# Patient Record
Sex: FEMALE | Race: WHITE | NOT HISPANIC OR LATINO | Employment: OTHER | URBAN - NONMETROPOLITAN AREA
[De-identification: names, ages, dates, MRNs, and addresses within clinical notes are randomized per-mention and may not be internally consistent; named-entity substitution may affect disease eponyms.]

---

## 2022-01-23 PROCEDURE — U0004 COV-19 TEST NON-CDC HGH THRU: HCPCS | Performed by: NURSE PRACTITIONER

## 2022-06-23 ENCOUNTER — TELEPHONE (OUTPATIENT)
Dept: PODIATRY | Facility: CLINIC | Age: 62
End: 2022-06-23

## 2022-06-23 NOTE — TELEPHONE ENCOUNTER
Called pt to let her know about her upcoming apt with [facility] at 07/01/2022 @ 9:15, pt did confirm the apt

## 2022-07-19 ENCOUNTER — PATIENT ROUNDING (BHMG ONLY) (OUTPATIENT)
Dept: INTERNAL MEDICINE | Facility: CLINIC | Age: 62
End: 2022-07-19

## 2022-07-19 ENCOUNTER — OFFICE VISIT (OUTPATIENT)
Dept: INTERNAL MEDICINE | Facility: CLINIC | Age: 62
End: 2022-07-19

## 2022-07-19 VITALS
TEMPERATURE: 97.8 F | OXYGEN SATURATION: 98 % | HEIGHT: 64 IN | RESPIRATION RATE: 16 BRPM | BODY MASS INDEX: 31.14 KG/M2 | DIASTOLIC BLOOD PRESSURE: 86 MMHG | WEIGHT: 182.4 LBS | HEART RATE: 113 BPM | SYSTOLIC BLOOD PRESSURE: 184 MMHG

## 2022-07-19 DIAGNOSIS — E78.00 PURE HYPERCHOLESTEROLEMIA: ICD-10-CM

## 2022-07-19 DIAGNOSIS — I10 ESSENTIAL HYPERTENSION: ICD-10-CM

## 2022-07-19 DIAGNOSIS — Z00.01 ANNUAL VISIT FOR GENERAL ADULT MEDICAL EXAMINATION WITH ABNORMAL FINDINGS: ICD-10-CM

## 2022-07-19 DIAGNOSIS — R73.01 IMPAIRED FASTING GLUCOSE: ICD-10-CM

## 2022-07-19 DIAGNOSIS — I10 WHITE COAT SYNDROME WITH DIAGNOSIS OF HYPERTENSION: Primary | ICD-10-CM

## 2022-07-19 PROBLEM — E04.2 NONTOXIC MULTINODULAR GOITER: Status: ACTIVE | Noted: 2017-11-09

## 2022-07-19 PROBLEM — K57.30 DIVERTICULOSIS OF LARGE INTESTINE WITHOUT DIVERTICULITIS: Status: ACTIVE | Noted: 2017-11-09

## 2022-07-19 PROCEDURE — 99204 OFFICE O/P NEW MOD 45 MIN: CPT | Performed by: INTERNAL MEDICINE

## 2022-07-19 NOTE — PROGRESS NOTES
CC: Establish care for hypertension and prediabetes.    History:  Cheli Bah is a 61 y.o. female   She notes she has been doing well and is seeking to establish care after relocating to this area from the [facility].  She has known hypertension, but she notes significant exacerbations in the presence of whitecoats in the office.  She has brought a log of her blood pressures for the past few weeks showing systolic values between 110 and 120 and diastolic values between 60 and 70 for the most part.  She has no out of range values during this time period.  She has prediabetes, though her last A1c was 5.7% in December 2021.  She does continue on metformin without symptoms of hyperglycemia.        ROS:  Review of Systems   Constitutional: Negative for chills and fever.   Respiratory: Negative for cough and shortness of breath.    Cardiovascular: Negative for chest pain and palpitations.   Gastrointestinal: Negative for abdominal pain and constipation.   Genitourinary: Negative for difficulty urinating and dysuria.        reports that she quit smoking about 35 years ago. Her smoking use included cigarettes. She started smoking about 45 years ago. She has a 5.00 pack-year smoking history. She has never used smokeless tobacco. She reports current alcohol use. She reports that she does not use drugs.      Current Outpatient Medications:   •  aspirin 81 MG EC tablet, Take 81 mg by mouth., Disp: , Rfl:   •  atorvastatin (LIPITOR) 20 MG tablet, TAKE 1 TABLET BY MOUTH ONCE DAILY ON MONDAY, WEDNESDAY AND FRIDAY., Disp: , Rfl:   •  Calcium Carb-Cholecalciferol (Calcium Carbonate-Vitamin D3) 600-400 MG-UNIT tablet, Take  by mouth., Disp: , Rfl:   •  cetirizine (zyrTEC) 10 MG tablet, Take 10 mg by mouth Daily., Disp: , Rfl:   •  irbesartan (AVAPRO) 150 MG tablet, Take 150 mg by mouth Daily., Disp: , Rfl:   •  metFORMIN (GLUCOPHAGE) 500 MG tablet, Take 500 mg by mouth 2 (Two) Times a Day With Meals., Disp: , Rfl:  "    OBJECTIVE:  BP (!) 184/86 (BP Location: Right leg, Patient Position: Sitting, Cuff Size: Adult) Comment: auto cuff  Pulse 113   Temp 97.8 °F (36.6 °C)   Resp 16   Ht 162.6 cm (64\")   Wt 82.7 kg (182 lb 6.4 oz)   SpO2 98%   Breastfeeding No   BMI 31.31 kg/m²    Physical Exam  Constitutional:       General: She is not in acute distress.     Appearance: She is well-developed.   HENT:      Head: Normocephalic and atraumatic.      Right Ear: Ear canal and external ear normal.      Left Ear: Ear canal and external ear normal.   Eyes:      General: No scleral icterus.     Extraocular Movements: Extraocular movements intact.   Neck:      Trachea: No tracheal deviation.   Cardiovascular:      Rate and Rhythm: Normal rate and regular rhythm.      Heart sounds: Normal heart sounds. No murmur heard.  Pulmonary:      Effort: Pulmonary effort is normal. No accessory muscle usage or respiratory distress.      Breath sounds: Normal breath sounds. No wheezing.   Abdominal:      General: There is no distension.      Palpations: Abdomen is soft.      Tenderness: There is no abdominal tenderness.   Musculoskeletal:         General: Normal range of motion.      Cervical back: Normal range of motion and neck supple.      Right lower leg: No edema.      Left lower leg: No edema.   Skin:     General: Skin is warm and dry.      Nails: There is no clubbing.   Neurological:      Mental Status: She is alert and oriented to person, place, and time.      Coordination: Coordination normal.      Gait: Gait normal.   Psychiatric:         Mood and Affect: Mood normal. Mood is not anxious or depressed.         Behavior: Behavior normal.         Assessment/Plan     Diagnoses and all orders for this visit:    1. White coat syndrome with diagnosis of hypertension (Primary)  2. Essential hypertension  -     Comprehensive Metabolic Panel; Future  -     Lipid Panel; Future  -     Hepatitis C Antibody; Future  Well controlled, BP goal for age is " <140/90 per JNC 8 guidelines, continue current medications and monitor with ambulatory readings as she had done.     3. Impaired fasting glucose  -     Comprehensive Metabolic Panel; Future  -     Hemoglobin A1c; Future  A1c prior to follow-up.     4. Annual visit for general adult medical examination with abnormal findings  -     Hepatitis C Antibody; Future    5. Pure hypercholesterolemia  -     Comprehensive Metabolic Panel; Future  -     Lipid Panel; Future  She is using statin 3 times weekly with good control and may continue.       An After Visit Summary was printed and given to the patient at discharge.  Return in about 3 months (around 10/19/2022) for Annual physical with me.          Alex Torres D.O. 7/19/2022   Electronically signed.

## 2022-07-19 NOTE — PROGRESS NOTES
July 19, 2022    Hello, may I speak with Cheli Bah?    My name is Mariana Gibbons     I am  with [facility]  [address], [address]  [place] [address] [phone].    Before we get started may I verify your date of birth? 1960    I am calling to officially welcome you to our practice and ask about your recent visit. Is this a good time to talk? yes    Tell me about your visit with us. What things went well? The DrLouise is very easy to talk to!       We're always looking for ways to make our patients' experiences even better. Do you have recommendations on ways we may improve?  no    Overall were you satisfied with your first visit to our practice? yes       I appreciate you taking the time to speak with me today. Is there anything else I can do for you? no      Thank you, and have a great day.      
clear bilaterally.  Pupils equal, round, and reactive to light.

## 2022-08-01 ENCOUNTER — LAB (OUTPATIENT)
Dept: LAB | Facility: HOSPITAL | Age: 62
End: 2022-08-01

## 2022-08-01 ENCOUNTER — TELEPHONE (OUTPATIENT)
Dept: INTERNAL MEDICINE | Facility: CLINIC | Age: 62
End: 2022-08-01

## 2022-08-01 DIAGNOSIS — R31.0 GROSS HEMATURIA: Primary | ICD-10-CM

## 2022-08-01 DIAGNOSIS — Z00.01 ANNUAL VISIT FOR GENERAL ADULT MEDICAL EXAMINATION WITH ABNORMAL FINDINGS: ICD-10-CM

## 2022-08-01 DIAGNOSIS — E78.00 PURE HYPERCHOLESTEROLEMIA: ICD-10-CM

## 2022-08-01 DIAGNOSIS — R31.0 GROSS HEMATURIA: ICD-10-CM

## 2022-08-01 DIAGNOSIS — R73.01 IMPAIRED FASTING GLUCOSE: ICD-10-CM

## 2022-08-01 DIAGNOSIS — I10 ESSENTIAL HYPERTENSION: ICD-10-CM

## 2022-08-01 LAB
BACTERIA UR QL AUTO: ABNORMAL /HPF
BILIRUB UR QL STRIP: NEGATIVE
CLARITY UR: CLEAR
COLOR UR: ABNORMAL
GLUCOSE UR STRIP-MCNC: NEGATIVE MG/DL
HGB UR QL STRIP.AUTO: ABNORMAL
HYALINE CASTS UR QL AUTO: ABNORMAL /LPF
KETONES UR QL STRIP: ABNORMAL
LEUKOCYTE ESTERASE UR QL STRIP.AUTO: ABNORMAL
NITRITE UR QL STRIP: NEGATIVE
PH UR STRIP.AUTO: 5.5 [PH] (ref 5–8)
PROT UR QL STRIP: ABNORMAL
RBC # UR STRIP: ABNORMAL /HPF
REF LAB TEST METHOD: ABNORMAL
SP GR UR STRIP: 1.02 (ref 1–1.03)
SQUAMOUS #/AREA URNS HPF: ABNORMAL /HPF
UROBILINOGEN UR QL STRIP: ABNORMAL
WBC # UR STRIP: ABNORMAL /HPF

## 2022-08-01 PROCEDURE — 81001 URINALYSIS AUTO W/SCOPE: CPT

## 2022-08-01 PROCEDURE — 87086 URINE CULTURE/COLONY COUNT: CPT

## 2022-08-01 RX ORDER — NITROFURANTOIN 25; 75 MG/1; MG/1
100 CAPSULE ORAL 2 TIMES DAILY
Qty: 10 CAPSULE | Refills: 0 | Status: SHIPPED | OUTPATIENT
Start: 2022-08-01 | End: 2022-08-06

## 2022-08-01 NOTE — TELEPHONE ENCOUNTER
Patient stated that she is having blood in her urine when she urinates. No other symptoms. She is agreeable to a UA.

## 2022-08-02 LAB — BACTERIA SPEC AEROBE CULT: NO GROWTH

## 2022-08-10 NOTE — PROGRESS NOTES
[facility] - PODIATRY    Today's Date: 2022     Patient Name: Cheli Bah  MRN: [id number]  CSN: [id number]  PCP: Alex Torres DO  Referring Provider: No ref. provider found    SUBJECTIVE     Chief Complaint   Patient presents with   • Establish Care     Alex Torres DO PCP2022 Avulsion of toenail,- pt states first week of  dropped heavy box on left great nail, nail turned black and hasnt gotten much better- pt denies pain at this point- pt presents with left great nail blackish color   • Diabetes     Prediabetic      HPI: Cheli Bah, a 61 y.o.female, comes to clinic as a(n) new patient complaining of Discoloration to left hallux nail secondary to trauma. Patient has h/o Hypertension, hypercholesterolemia, thyroid nodules, diverticulitis, cataract, impaired fasting glucose. Patient presents complaints of discoloration of the left hallux nail.  Patient states that she dropped a heavy box on the toe at the beginning of .  She states that she went to urgent care for initial evaluation greater than 48 hours after the event.  Was told that she would need podiatry evaluation for further work-up.  States that since that time she has experienced no pain in the nail.  She states that nail does not feel loose.  She has had no signs or symptoms of infection including redness, drainage, or infection. Denies pain. Denies previous treatment. Denies any constitutional symptoms. No other pedal complaints at this time.    Past Medical History:   Diagnosis Date   • Allergic     Seasonal/25+ yrs   • Cataract 2022    Removed 2022   • Diverticulosis 2011   • History of medical problems 2002    Thyroid  nodules   • Hypercholesteremia    • Hypertension      Past Surgical History:   Procedure Laterality Date   •  SECTION     • COLONOSCOPY  2018    2nd one (@ 48 and 59 y/o)   • EYE SURGERY  2022    Cataracts removed   • HYSTERECTOMY     • KNEE  ARTHROSCOPY Bilateral     2015   • LYMPH NODE BIOPSY  Aug 2012     Family History   Problem Relation Age of Onset   • Hypertension Mother    • Rheum arthritis Mother    • Arthritis Mother         RA   • Hearing loss Mother    • Hyperlipidemia Mother    • Pneumonia Father         passed away with Bacteria Pneumonia   • Early death Father         Pneumonia ( at 32 y/o)   • Stroke Sister    • Seizures Daughter         unknown   • No Known Problems Maternal Grandmother    • No Known Problems Maternal Grandfather    • Stomach cancer Paternal Grandmother         passed at 86   • Cancer Paternal Grandmother         Stomach ( at 87 y/o)   • Stroke Paternal Grandfather         Stroke 1966   • Hyperlipidemia Brother    • Mental illness Sister         Bipolar   • Stroke Sister          2022   • Other Daughter         Seizure disorder (unknown)     Social History     Socioeconomic History   • Marital status:    Tobacco Use   • Smoking status: Former Smoker     Packs/day: 0.50     Years: 10.00     Pack years: 5.00     Types: Cigarettes     Start date: 1977     Quit date: 1987     Years since quittin.3   • Smokeless tobacco: Never Used   • Tobacco comment: Sporadic use during the 10 years. Quit for good at beginning of my first pregnancy.   Vaping Use   • Vaping Use: Never used   Substance and Sexual Activity   • Alcohol use: Yes     Comment: rarely    • Drug use: Never   • Sexual activity: Yes     Partners: Male     Birth control/protection: Surgical     Allergies   Allergen Reactions   • Amoxicillin-Pot Clavulanate Diarrhea   • Drug Ingredient [Levofloxacin] GI Intolerance   • Metoclopramide Anxiety     Current Outpatient Medications   Medication Sig Dispense Refill   • aspirin 81 MG EC tablet Take 81 mg by mouth.     • atorvastatin (LIPITOR) 20 MG tablet TAKE 1 TABLET BY MOUTH ONCE DAILY ON MONDAY, WEDNESDAY AND FRIDAY.     • Calcium Carb-Cholecalciferol (Calcium Carbonate-Vitamin  D3) 600-400 MG-UNIT tablet Take  by mouth.     • cetirizine (zyrTEC) 10 MG tablet Take 10 mg by mouth Daily.     • irbesartan (AVAPRO) 150 MG tablet Take 150 mg by mouth Daily.     • metFORMIN (GLUCOPHAGE) 500 MG tablet Take 500 mg by mouth 2 (Two) Times a Day With Meals.       No current facility-administered medications for this visit.     Review of Systems   Constitutional: Negative for chills and fever.   HENT: Negative for congestion.    Respiratory: Negative for shortness of breath.    Cardiovascular: Negative for chest pain and leg swelling.   Gastrointestinal: Negative for constipation, diarrhea, nausea and vomiting.   Musculoskeletal: Negative for arthralgias and myalgias.   Skin: Positive for color change. Negative for wound.   Neurological: Negative for numbness.       OBJECTIVE     Vitals:    08/12/22 1440   BP: 158/80   Pulse: 112   SpO2: 99%       PHYSICAL EXAM  GEN:   Accompanied by none.     Foot/Ankle Exam:       General:   Appearance: appears stated age and healthy    Orientation: AAOx3    Affect: appropriate    Gait: unimpaired    Assistance: independent    Shoe Gear:  Casual shoes    VASCULAR      Right Foot Vascularity   Dorsalis pedis:  2+  Posterior tibial:  2+  Skin Temperature: warm    Edema Grading:  None  CFT:  3  Pedal Hair Growth:  Present  Varicosities: none       Left Foot Vascularity   Dorsalis pedis:  2+  Posterior tibial:  2+  Skin Temperature: warm    Edema Grading:  None  CFT:  3  Pedal Hair Growth:  Present  Varicosities: none        NEUROLOGIC     Right Foot Neurologic   Normal sensation    Light touch sensation:  Normal  Vibratory sensation:  Normal  Hot/Cold sensation: normal    Protective Sensation using [place] Monofilament:  10     Left Foot Neurologic   Normal sensation    Light touch sensation:  Normal  Vibratory sensation:  Normal  Hot/cold sensation: normal    Protective Sensation using [place] Monofilament:  10     MUSCULOSKELETAL      Right Foot  Musculoskeletal   Ecchymosis:  None  Tenderness: none    Arch:  Normal     Left Foot Musculoskeletal   Ecchymosis:  None  Tenderness: none    Arch:  Normal     MUSCLE STRENGTH     Right Foot Muscle Strength   Foot dorsiflexion:  5  Foot plantar flexion:  5  Foot inversion:  5  Foot eversion:  5     Left Foot Muscle Strength   Foot dorsiflexion:  5  Foot plantar flexion:  5  Foot inversion:  5  Foot eversion:  5     RANGE OF MOTION      Right Foot Range of Motion   Foot and ankle ROM within normal limits       Left Foot Range of Motion   Foot and ankle ROM within normal limits       DERMATOLOGIC     Right Foot Dermatologic   Skin: skin intact       Left Foot Dermatologic   Skin: skin intact        RADIOLOGY/NUCLEAR:  No results found.    LABORATORY/CULTURE RESULTS:      PATHOLOGY RESULTS:       ASSESSMENT/PLAN     Diagnoses and all orders for this visit:    1. Subungual hematoma of great toe (Primary)    2. Impaired fasting glucose      Comprehensive lower extremity examination and evaluation was performed.  Discussed findings and treatment plan including risks, benefits, and treatment options with patient in detail. Patient agreed with treatment plan.  Findings are consistent with subungual hematoma of the left hallux.  Given that initial event was greater than 2 months ago and she has no active symptoms, no current treatment is needed at this time.  Patient was advised that nail growth proximally was at 1.5 to 2 mm which is consistent with timeframe of injury and that nail appears to be growing out appropriately.  She was advised that she could potentially lose the nail as it continues to grow out, however, given current findings there is no concern for any long-term damage or other acute issue.   Continue to follow with PCP for impaired fasting glucose.  If patient develops diabetes in the future we would see for routine annual diabetic foot exams.  An After Visit Summary was printed and given to the patient at  discharge, including (if requested) any available informative/educational handouts regarding diagnosis, treatment, or medications. All questions were answered to patient/family satisfaction. Should symptoms fail to improve or worsen they agree to call or return to clinic or to go to the Emergency Department. Discussed the importance of following up with any needed screening tests/labs/specialist appointments and any requested follow-up recommended by me today. Importance of maintaining follow-up discussed and patient accepts that missed appointments can delay diagnosis and potentially lead to worsening of conditions.  Return if symptoms worsen or fail to improve., or sooner if acute issues arise.        This document has been electronically signed by TERENCE Lunsford on August 15, 2022 08:37 CDT

## 2022-08-12 ENCOUNTER — OFFICE VISIT (OUTPATIENT)
Dept: PODIATRY | Facility: CLINIC | Age: 62
End: 2022-08-12

## 2022-08-12 VITALS
HEIGHT: 64 IN | OXYGEN SATURATION: 99 % | BODY MASS INDEX: 31.07 KG/M2 | SYSTOLIC BLOOD PRESSURE: 158 MMHG | HEART RATE: 112 BPM | WEIGHT: 182 LBS | DIASTOLIC BLOOD PRESSURE: 80 MMHG

## 2022-08-12 DIAGNOSIS — R73.01 IMPAIRED FASTING GLUCOSE: ICD-10-CM

## 2022-08-12 DIAGNOSIS — S90.219A SUBUNGUAL HEMATOMA OF GREAT TOE: Primary | ICD-10-CM

## 2022-08-12 PROCEDURE — 99213 OFFICE O/P EST LOW 20 MIN: CPT | Performed by: NURSE PRACTITIONER

## 2022-09-27 ENCOUNTER — OFFICE VISIT (OUTPATIENT)
Dept: INTERNAL MEDICINE | Facility: CLINIC | Age: 62
End: 2022-09-27

## 2022-09-27 VITALS
WEIGHT: 184 LBS | HEIGHT: 64 IN | BODY MASS INDEX: 31.41 KG/M2 | OXYGEN SATURATION: 97 % | HEART RATE: 98 BPM | SYSTOLIC BLOOD PRESSURE: 134 MMHG | DIASTOLIC BLOOD PRESSURE: 86 MMHG

## 2022-09-27 DIAGNOSIS — J06.9 UPPER RESPIRATORY TRACT INFECTION, UNSPECIFIED TYPE: ICD-10-CM

## 2022-09-27 DIAGNOSIS — J40 BRONCHITIS: Primary | ICD-10-CM

## 2022-09-27 PROCEDURE — 99213 OFFICE O/P EST LOW 20 MIN: CPT | Performed by: NURSE PRACTITIONER

## 2022-09-27 RX ORDER — AZITHROMYCIN 250 MG/1
TABLET, FILM COATED ORAL
Qty: 6 TABLET | Refills: 0 | Status: SHIPPED | OUTPATIENT
Start: 2022-09-27 | End: 2022-11-02

## 2022-09-27 NOTE — PROGRESS NOTES
Chief Complaint   Patient presents with   • Cough   • Nasal Congestion         History:  Cheli Bah is a 61 y.o. female who presents today for evaluation of the above problems.          For over a week she has had congestion and cough since being around her grandchild that had cold/croup symptoms.  2 days ago she did a home Covid test, which was negative.  In the last 3 days, she has noticed worsening cough and chest congestion. Slight yellow color to mucous.  No fever now but did have up to 100 when this started.      ROS:  Review of Systems  As above    Allergies   Allergen Reactions   • Amoxicillin-Pot Clavulanate Diarrhea   • Drug Ingredient [Levofloxacin] GI Intolerance   • Metoclopramide Anxiety     Past Medical History:   Diagnosis Date   • Allergic     Seasonal/25+ yrs   • Cataract 2022    Removed 2022   • Diverticulosis 2011   • History of medical problems 2002    Thyroid  nodules   • Hypercholesteremia    • Hypertension      Past Surgical History:   Procedure Laterality Date   •  SECTION     • COLONOSCOPY  2018    2nd one (@ 48 and 59 y/o)   • EYE SURGERY  2022    Cataracts removed   • HYSTERECTOMY     • KNEE ARTHROSCOPY Bilateral     2015   • LYMPH NODE BIOPSY  Aug 2012     Family History   Problem Relation Age of Onset   • Hypertension Mother    • Rheum arthritis Mother    • Arthritis Mother         RA   • Hearing loss Mother    • Hyperlipidemia Mother    • Pneumonia Father         passed away with Bacteria Pneumonia   • Early death Father         Pneumonia ( at 34 y/o)   • Stroke Sister    • Seizures Daughter         unknown   • No Known Problems Maternal Grandmother    • No Known Problems Maternal Grandfather    • Stomach cancer Paternal Grandmother         passed at 86   • Cancer Paternal Grandmother         Stomach ( at 87 y/o)   • Stroke Paternal Grandfather         Stroke 1966   • Hyperlipidemia Brother    • Mental illness Sister         Bipolar   •  "Stroke Sister          2022   • Other Daughter         Seizure disorder (unknown)     Cheli  reports that she quit smoking about 35 years ago. Her smoking use included cigarettes. She started smoking about 45 years ago. She has a 5.00 pack-year smoking history. She has never used smokeless tobacco. She reports current alcohol use. She reports that she does not use drugs.    I have reviewed and updated the above documentation (if necessary) including but not limited to chief complaint, ROS, PFSH, allergies and medications        Current Outpatient Medications:   •  aspirin 81 MG EC tablet, Take 81 mg by mouth., Disp: , Rfl:   •  atorvastatin (LIPITOR) 20 MG tablet, TAKE 1 TABLET BY MOUTH ONCE DAILY ON MONDAY, WEDNESDAY AND FRIDAY., Disp: , Rfl:   •  Calcium Carb-Cholecalciferol (Calcium Carbonate-Vitamin D3) 600-400 MG-UNIT tablet, Take  by mouth., Disp: , Rfl:   •  cetirizine (zyrTEC) 10 MG tablet, Take 10 mg by mouth Daily., Disp: , Rfl:   •  irbesartan (AVAPRO) 150 MG tablet, Take 150 mg by mouth Daily., Disp: , Rfl:   •  metFORMIN (GLUCOPHAGE) 500 MG tablet, Take 500 mg by mouth 2 (Two) Times a Day With Meals., Disp: , Rfl:   •  azithromycin (Zithromax Z-Haroon) 250 MG tablet, Don't take Lipitor while on this medication. Take 2 tablets by mouth on day 1, then 1 tablet daily on days 2-5. Take with food., Disp: 6 tablet, Rfl: 0    OBJECTIVE:  Visit Vitals  /86 (BP Location: Right arm, Patient Position: Sitting, Cuff Size: Adult)   Pulse 98   Ht 162.6 cm (64\")   Wt 83.5 kg (184 lb)   SpO2 97%   BMI 31.58 kg/m²      Physical Exam  Vitals and nursing note reviewed.   Constitutional:       General: She is not in acute distress.     Appearance: Normal appearance. She is not ill-appearing, toxic-appearing or diaphoretic.   HENT:      Head: Normocephalic and atraumatic.      Right Ear: Tympanic membrane, ear canal and external ear normal. There is no impacted cerumen.      Left Ear: Tympanic membrane, ear " canal and external ear normal. There is no impacted cerumen.      Nose: Nose normal. No congestion or rhinorrhea.      Comments: Tender left maxillary sinus region     Mouth/Throat:      Mouth: Mucous membranes are moist.      Pharynx: Oropharynx is clear. No oropharyngeal exudate or posterior oropharyngeal erythema.   Eyes:      General: No scleral icterus.        Right eye: No discharge.         Left eye: No discharge.      Conjunctiva/sclera: Conjunctivae normal.      Pupils: Pupils are equal, round, and reactive to light.   Cardiovascular:      Rate and Rhythm: Normal rate and regular rhythm.   Pulmonary:      Effort: Pulmonary effort is normal. No respiratory distress.      Breath sounds: Normal breath sounds. No wheezing.      Comments: Persistent, hacky cough noted throughout visit  Abdominal:      General: There is no distension.      Palpations: Abdomen is soft.      Tenderness: There is no abdominal tenderness.   Musculoskeletal:      Cervical back: Neck supple. No tenderness.      Right lower leg: No edema.      Left lower leg: No edema.   Lymphadenopathy:      Cervical: No cervical adenopathy.   Skin:     General: Skin is warm and dry.      Findings: No rash.   Neurological:      General: No focal deficit present.      Mental Status: She is alert and oriented to person, place, and time.   Psychiatric:         Mood and Affect: Mood normal.         Behavior: Behavior normal.         Thought Content: Thought content normal.         Judgment: Judgment normal.         [facility]    Assessment/Plan    Diagnoses and all orders for this visit:    1. Bronchitis (Primary)  -     azithromycin (Zithromax Z-Haroon) 250 MG tablet; Don't take Lipitor while on this medication. Take 2 tablets by mouth on day 1, then 1 tablet daily on days 2-5. Take with food.  Dispense: 6 tablet; Refill: 0    2. Upper respiratory tract infection, unspecified type      Patient with likely viral URI following exposure to sick grandchild, with  symptoms over 1 week, now worsening cough.  I would like to treat for secondary bacterial infection and have asked her to take Mucinex that she has at home and increase fluids.  Let us know if worsening.         Education materials and an After Visit Summary were printed and given to the patient at discharge.  Return if symptoms worsen or fail to improve, for Next scheduled follow up.         Marlys Vincent, APRN   12:49 CDT  9/27/2022

## 2022-09-28 ENCOUNTER — TELEPHONE (OUTPATIENT)
Dept: INTERNAL MEDICINE | Facility: CLINIC | Age: 62
End: 2022-09-28

## 2022-09-28 NOTE — TELEPHONE ENCOUNTER
PATIENT HAS BEEN CALLED, SHE IS ASKING FOR SOME KIND OF PROBIOTIC THAT IS OVER THE COUNTER THAT IS RECOMMENDED.

## 2022-09-28 NOTE — TELEPHONE ENCOUNTER
Caller: Cheli Bah    Relationship: Self    Best call back number: [phone]      Who are you requesting to speak with (clinical staff, provider,  specific staff member): CLINICAL STAFF    Do you know the name of the person who called: PATIENT    What was the call regarding: RECOMMEND FOR PROBIOTIC FOR OVER THE COUNTER     Do you require a callback: YES

## 2022-10-12 ENCOUNTER — CLINICAL SUPPORT (OUTPATIENT)
Dept: INTERNAL MEDICINE | Facility: CLINIC | Age: 62
End: 2022-10-12

## 2022-10-12 DIAGNOSIS — Z23 NEED FOR VACCINATION: Primary | ICD-10-CM

## 2022-10-12 PROCEDURE — 0124A PR ADM SARSCOV2 30MCG/0.3ML BST: CPT | Performed by: INTERNAL MEDICINE

## 2022-10-12 PROCEDURE — 90471 IMMUNIZATION ADMIN: CPT | Performed by: INTERNAL MEDICINE

## 2022-10-12 PROCEDURE — 91312 COVID-19 (PFIZER) BIVALENT BOOSTER 12+YRS: CPT | Performed by: INTERNAL MEDICINE

## 2022-10-12 PROCEDURE — 90686 IIV4 VACC NO PRSV 0.5 ML IM: CPT | Performed by: INTERNAL MEDICINE

## 2022-10-12 NOTE — PROGRESS NOTES
Injection  Injection performed in Left Deltoid Bivalent Booster- Right Deltoid Flulave/Fluzone  by Channing Tomlinson MA. Patient tolerated the procedure well without complications.  10/12/22   Channing Tomlinson MA

## 2022-11-02 ENCOUNTER — OFFICE VISIT (OUTPATIENT)
Dept: INTERNAL MEDICINE | Facility: CLINIC | Age: 62
End: 2022-11-02

## 2022-11-02 VITALS
WEIGHT: 183.5 LBS | DIASTOLIC BLOOD PRESSURE: 80 MMHG | SYSTOLIC BLOOD PRESSURE: 158 MMHG | BODY MASS INDEX: 31.33 KG/M2 | TEMPERATURE: 98 F | OXYGEN SATURATION: 98 % | HEART RATE: 111 BPM | RESPIRATION RATE: 16 BRPM | HEIGHT: 64 IN

## 2022-11-02 DIAGNOSIS — I10 ESSENTIAL HYPERTENSION: ICD-10-CM

## 2022-11-02 DIAGNOSIS — E04.2 NONTOXIC MULTINODULAR GOITER: ICD-10-CM

## 2022-11-02 DIAGNOSIS — Z00.01 ANNUAL VISIT FOR GENERAL ADULT MEDICAL EXAMINATION WITH ABNORMAL FINDINGS: ICD-10-CM

## 2022-11-02 DIAGNOSIS — M76.822 POSTERIOR TIBIAL TENDINITIS OF LEFT LOWER EXTREMITY: Primary | ICD-10-CM

## 2022-11-02 DIAGNOSIS — R73.02 IMPAIRED GLUCOSE TOLERANCE: ICD-10-CM

## 2022-11-02 PROBLEM — R73.01 IMPAIRED FASTING GLUCOSE: Status: RESOLVED | Noted: 2017-11-09 | Resolved: 2022-11-02

## 2022-11-02 PROCEDURE — 99214 OFFICE O/P EST MOD 30 MIN: CPT | Performed by: INTERNAL MEDICINE

## 2022-11-02 NOTE — PROGRESS NOTES
"CC: left foot/ankle pain    History:  Cheli Bah is a 61 y.o. female   She notes she has had issues with posterior tibial tendinitis in the past that improved with stretching and PT, but this was some time ago. SHe notes it is intermittent and waxes and wanes in intensity.        ROS:  Review of Systems   Respiratory: Negative for shortness of breath.    Musculoskeletal: Positive for arthralgias and myalgias. Negative for gait problem.        reports that she quit smoking about 35 years ago. Her smoking use included cigarettes. She started smoking about 45 years ago. She has a 5.00 pack-year smoking history. She has never used smokeless tobacco. She reports current alcohol use. She reports that she does not use drugs.      Current Outpatient Medications:   •  aspirin 81 MG EC tablet, Take 81 mg by mouth., Disp: , Rfl:   •  atorvastatin (LIPITOR) 20 MG tablet, TAKE 1 TABLET BY MOUTH ONCE DAILY ON MONDAY, WEDNESDAY AND FRIDAY., Disp: , Rfl:   •  Calcium Carb-Cholecalciferol (Calcium Carbonate-Vitamin D3) 600-400 MG-UNIT tablet, Take  by mouth., Disp: , Rfl:   •  cetirizine (zyrTEC) 10 MG tablet, Take 10 mg by mouth Daily., Disp: , Rfl:   •  irbesartan (AVAPRO) 150 MG tablet, Take 150 mg by mouth Daily., Disp: , Rfl:   •  metFORMIN (GLUCOPHAGE) 500 MG tablet, Take 500 mg by mouth 2 (Two) Times a Day With Meals., Disp: , Rfl:     OBJECTIVE:  /80 (BP Location: Right arm, Patient Position: Sitting, Cuff Size: Adult)   Pulse 111   Temp 98 °F (36.7 °C)   Resp 16   Ht 162.6 cm (64\")   Wt 83.2 kg (183 lb 8 oz)   SpO2 98%   BMI 31.50 kg/m²    Physical Exam  Constitutional:       General: She is not in acute distress.  Pulmonary:      Effort: Pulmonary effort is normal. No respiratory distress.   Musculoskeletal:      Comments: Mild tenderness over medial left ankle.    Neurological:      Mental Status: She is alert and oriented to person, place, and time.         Assessment/Plan     Diagnoses and all orders for " this visit:    1. Posterior tibial tendinitis of left lower extremity (Primary)  Stretches with AVS and encourage rest, ice, compression and elevation.     2. Essential hypertension  -     CBC (No Diff)  -     Comprehensive Metabolic Panel  -     Lipid Panel  Well controlled, BP goal for age is <140/90 per JNC 8 guidelines and continue current medications    3. Impaired glucose tolerance  -     Hemoglobin A1c  Labs prior to next visit.     4. Nontoxic multinodular goiter  -     US Thyroid; Future  -     TSH  Lab and US for evaluation.     5. Annual visit for general adult medical examination with abnormal findings  -     CBC (No Diff)  -     Comprehensive Metabolic Panel  -     Hemoglobin A1c  -     Hepatitis C Antibody  -     Lipid Panel    An After Visit Summary was printed and given to the patient at discharge.  Return for Next scheduled follow up.          Alex Torres D.O. 11/2/2022   Electronically signed.

## 2022-11-07 ENCOUNTER — OFFICE VISIT (OUTPATIENT)
Dept: INTERNAL MEDICINE | Facility: CLINIC | Age: 62
End: 2022-11-07

## 2022-11-07 ENCOUNTER — LAB (OUTPATIENT)
Dept: LAB | Facility: HOSPITAL | Age: 62
End: 2022-11-07

## 2022-11-07 VITALS
HEIGHT: 64 IN | DIASTOLIC BLOOD PRESSURE: 82 MMHG | OXYGEN SATURATION: 98 % | RESPIRATION RATE: 16 BRPM | SYSTOLIC BLOOD PRESSURE: 138 MMHG | WEIGHT: 185.5 LBS | HEART RATE: 90 BPM | BODY MASS INDEX: 31.67 KG/M2 | TEMPERATURE: 98 F

## 2022-11-07 DIAGNOSIS — N89.8 VAGINAL ITCHING: ICD-10-CM

## 2022-11-07 DIAGNOSIS — N89.8 VAGINAL DISCHARGE: Primary | ICD-10-CM

## 2022-11-07 DIAGNOSIS — N89.8 VAGINAL DISCHARGE: ICD-10-CM

## 2022-11-07 LAB
BILIRUB UR QL STRIP: NEGATIVE
CLARITY UR: CLEAR
COLOR UR: YELLOW
GLUCOSE UR STRIP-MCNC: NEGATIVE MG/DL
HGB UR QL STRIP.AUTO: NEGATIVE
KETONES UR QL STRIP: NEGATIVE
LEUKOCYTE ESTERASE UR QL STRIP.AUTO: NEGATIVE
NITRITE UR QL STRIP: NEGATIVE
PH UR STRIP.AUTO: 6 [PH] (ref 5–8)
PROT UR QL STRIP: NEGATIVE
SP GR UR STRIP: 1.01 (ref 1–1.03)
UROBILINOGEN UR QL STRIP: NORMAL

## 2022-11-07 PROCEDURE — 87591 N.GONORRHOEAE DNA AMP PROB: CPT

## 2022-11-07 PROCEDURE — 99213 OFFICE O/P EST LOW 20 MIN: CPT | Performed by: INTERNAL MEDICINE

## 2022-11-07 PROCEDURE — 87491 CHLMYD TRACH DNA AMP PROBE: CPT

## 2022-11-07 PROCEDURE — 87661 TRICHOMONAS VAGINALIS AMPLIF: CPT

## 2022-11-07 PROCEDURE — 81003 URINALYSIS AUTO W/O SCOPE: CPT

## 2022-11-07 NOTE — PROGRESS NOTES
"CC: vaginal discharge    History:  Cheli Bah is a 61 y.o. female   She notes a green vaginal discharge that began last Friday and has persisted through the weekend. It is thick and has not varied since. No fever or chills. She has not taken anything for it and notes a generalized vaginal itching, but has not had pain or dysuria.  She has not been sexually active over the last several months.       ROS:  Review of Systems   Constitutional: Negative for chills and fever.   HENT: Negative for sore throat.    Gastrointestinal: Negative for abdominal pain, constipation, diarrhea, nausea and vomiting.   Genitourinary: Positive for vaginal discharge. Negative for dysuria, flank pain, frequency, hematuria, pelvic pain and urgency.   Musculoskeletal: Positive for back pain.   Skin: Negative for rash.   Neurological: Negative for headaches.        reports that she quit smoking about 35 years ago. Her smoking use included cigarettes. She started smoking about 45 years ago. She has a 5.00 pack-year smoking history. She has never used smokeless tobacco. She reports current alcohol use. She reports that she does not use drugs.        OBJECTIVE:  /82 (BP Location: Left arm, Patient Position: Sitting, Cuff Size: Adult)   Pulse 90   Temp 98 °F (36.7 °C)   Resp 16   Ht 162.6 cm (64\")   Wt 84.1 kg (185 lb 8 oz)   SpO2 98%   BMI 31.84 kg/m²    Physical Exam  Constitutional:       General: She is not in acute distress.  Pulmonary:      Effort: Pulmonary effort is normal. No respiratory distress.   Neurological:      Mental Status: She is alert and oriented to person, place, and time.         Assessment/Plan     Diagnoses and all orders for this visit:    1. Vaginal discharge (Primary)  2. Vaginal itching  -     Urinalysis With Culture If Indicated -; Future  -     Trichomonas vaginalis, PCR - Urine, Urine, Clean Catch; Future  -     Chlamydia trachomatis, Neisseria gonorrhoeae, PCR - Urine, Urine, Clean Catch; " Future  Urine studies for further evaluation. Suspect possible yeast, so may treat empirically if studies negative, though would consider pelvic examination if not improving.     An After Visit Summary was printed and given to the patient at discharge.  No follow-ups on file.          Alex Torres D.O. 11/7/2022   Electronically signed.

## 2022-11-09 LAB
C TRACH RRNA CVX QL NAA+PROBE: NOT DETECTED
N GONORRHOEA RRNA SPEC QL NAA+PROBE: NOT DETECTED
TRICHOMONAS VAGINALIS PCR: NOT DETECTED

## 2022-11-09 RX ORDER — FLUCONAZOLE 150 MG/1
150 TABLET ORAL
Qty: 2 TABLET | Refills: 0 | Status: SHIPPED | OUTPATIENT
Start: 2022-11-09 | End: 2022-11-13

## 2022-11-15 ENCOUNTER — HOSPITAL ENCOUNTER (OUTPATIENT)
Dept: ULTRASOUND IMAGING | Facility: HOSPITAL | Age: 62
Discharge: HOME | End: 2022-11-15
Admitting: INTERNAL MEDICINE

## 2022-11-15 DIAGNOSIS — E04.2 NONTOXIC MULTINODULAR GOITER: ICD-10-CM

## 2022-11-15 PROCEDURE — 76536 US EXAM OF HEAD AND NECK: CPT

## 2022-12-08 LAB
ALBUMIN SERPL-MCNC: 4.6 G/DL (ref 3.8–4.8)
ALBUMIN/GLOB SERPL: 1.9 {RATIO} (ref 1.2–2.2)
ALP SERPL-CCNC: 72 IU/L (ref 44–121)
ALT SERPL-CCNC: 13 IU/L (ref 0–32)
AST SERPL-CCNC: 16 IU/L (ref 0–40)
BILIRUB SERPL-MCNC: 0.3 MG/DL (ref 0–1.2)
BUN SERPL-MCNC: 20 MG/DL (ref 8–27)
BUN/CREAT SERPL: 26 (ref 12–28)
CALCIUM SERPL-MCNC: 9.7 MG/DL (ref 8.7–10.3)
CHLORIDE SERPL-SCNC: 101 MMOL/L (ref 96–106)
CHOLEST SERPL-MCNC: 181 MG/DL (ref 100–199)
CO2 SERPL-SCNC: 24 MMOL/L (ref 20–29)
CREAT SERPL-MCNC: 0.78 MG/DL (ref 0.57–1)
EGFRCR SERPLBLD CKD-EPI 2021: 85 ML/MIN/1.73
ERYTHROCYTE [DISTWIDTH] IN BLOOD BY AUTOMATED COUNT: 13.1 % (ref 11.7–15.4)
GLOBULIN SER CALC-MCNC: 2.4 G/DL (ref 1.5–4.5)
GLUCOSE SERPL-MCNC: 107 MG/DL (ref 70–99)
HBA1C MFR BLD: 6 % (ref 4.8–5.6)
HCT VFR BLD AUTO: 38.3 % (ref 34–46.6)
HCV AB S/CO SERPL IA: <0.1 S/CO RATIO (ref 0–0.9)
HDLC SERPL-MCNC: 51 MG/DL
HGB BLD-MCNC: 12.9 G/DL (ref 11.1–15.9)
LDLC SERPL CALC-MCNC: 89 MG/DL (ref 0–99)
MCH RBC QN AUTO: 29.6 PG (ref 26.6–33)
MCHC RBC AUTO-ENTMCNC: 33.7 G/DL (ref 31.5–35.7)
MCV RBC AUTO: 88 FL (ref 79–97)
PLATELET # BLD AUTO: 295 X10E3/UL (ref 150–450)
POTASSIUM SERPL-SCNC: 4.9 MMOL/L (ref 3.5–5.2)
PROT SERPL-MCNC: 7 G/DL (ref 6–8.5)
RBC # BLD AUTO: 4.36 X10E6/UL (ref 3.77–5.28)
SODIUM SERPL-SCNC: 139 MMOL/L (ref 134–144)
TRIGL SERPL-MCNC: 243 MG/DL (ref 0–149)
TSH SERPL DL<=0.005 MIU/L-ACNC: 0.56 UIU/ML (ref 0.45–4.5)
VLDLC SERPL CALC-MCNC: 41 MG/DL (ref 5–40)
WBC # BLD AUTO: 10.1 X10E3/UL (ref 3.4–10.8)

## 2022-12-12 ENCOUNTER — OFFICE VISIT (OUTPATIENT)
Dept: INTERNAL MEDICINE | Facility: CLINIC | Age: 62
End: 2022-12-12

## 2022-12-12 VITALS
WEIGHT: 182.4 LBS | BODY MASS INDEX: 31.14 KG/M2 | TEMPERATURE: 98 F | HEIGHT: 64 IN | RESPIRATION RATE: 16 BRPM | DIASTOLIC BLOOD PRESSURE: 80 MMHG | HEART RATE: 104 BPM | OXYGEN SATURATION: 98 % | SYSTOLIC BLOOD PRESSURE: 150 MMHG

## 2022-12-12 DIAGNOSIS — I10 ESSENTIAL HYPERTENSION: ICD-10-CM

## 2022-12-12 DIAGNOSIS — E78.00 PURE HYPERCHOLESTEROLEMIA: ICD-10-CM

## 2022-12-12 DIAGNOSIS — Z00.01 ANNUAL VISIT FOR GENERAL ADULT MEDICAL EXAMINATION WITH ABNORMAL FINDINGS: Primary | ICD-10-CM

## 2022-12-12 DIAGNOSIS — R73.02 IMPAIRED GLUCOSE TOLERANCE: ICD-10-CM

## 2022-12-12 PROCEDURE — 99396 PREV VISIT EST AGE 40-64: CPT | Performed by: INTERNAL MEDICINE

## 2022-12-12 RX ORDER — ATORVASTATIN CALCIUM 20 MG/1
20 TABLET, FILM COATED ORAL 3 TIMES WEEKLY
Qty: 36 TABLET | Refills: 3 | Status: SHIPPED | OUTPATIENT
Start: 2022-12-12

## 2022-12-12 RX ORDER — IRBESARTAN 150 MG/1
150 TABLET ORAL DAILY
Qty: 90 TABLET | Refills: 3 | Status: SHIPPED | OUTPATIENT
Start: 2022-12-12

## 2022-12-12 NOTE — PROGRESS NOTES
CC: f/u preventive health    History:  Cheli Bah is a 62 y.o. female who presents today for evaluation of the above problems.  She notes she has been doing well without acute illness and has seen improvement in her foot pain. She has brought a blood pressure log showing all values over the past 10 days <140/90.       ROS:  Review of Systems   Constitutional: Negative for chills and fever.   HENT: Negative for congestion and sore throat.    Eyes: Negative for visual disturbance.   Respiratory: Negative for cough and shortness of breath.    Cardiovascular: Negative for chest pain and palpitations.   Gastrointestinal: Negative for abdominal pain, constipation and nausea.   Endocrine: Negative for cold intolerance and heat intolerance.   Genitourinary: Negative for difficulty urinating and frequency.   Musculoskeletal: Negative for arthralgias and back pain.   Skin: Negative for rash.   Neurological: Negative for dizziness and headaches.   Psychiatric/Behavioral: Negative for dysphoric mood. The patient is not nervous/anxious.        Allergies   Allergen Reactions   • Amoxicillin-Pot Clavulanate Diarrhea   • Drug Ingredient [Levofloxacin] GI Intolerance   • Metoclopramide Anxiety     Past Medical History:   Diagnosis Date   • Allergic     Seasonal/25+ yrs   • Cataract 2022    Removed 2022   • Diverticulosis 2011   • History of medical problems     Thyroid  nodules   • Hypercholesteremia    • Hypertension      Past Surgical History:   Procedure Laterality Date   •  SECTION     • COLONOSCOPY  2018    2nd one (@ 48 and 57 y/o)   • EYE SURGERY  2022    Cataracts removed   • HYSTERECTOMY     • KNEE ARTHROSCOPY Bilateral     2015   • LYMPH NODE BIOPSY  Aug 2012     Family History   Problem Relation Age of Onset   • Hypertension Mother    • Rheum arthritis Mother    • Arthritis Mother         RA   • Hearing loss Mother    • Hyperlipidemia Mother    • Pneumonia Father         passed  "away with Bacteria Pneumonia   • Early death Father         Pneumonia ( at 34 y/o)   • Stroke Sister    • Seizures Daughter         unknown   • No Known Problems Maternal Grandmother    • No Known Problems Maternal Grandfather    • Stomach cancer Paternal Grandmother         passed at 86   • Cancer Paternal Grandmother         Stomach ( at 87 y/o)   • Stroke Paternal Grandfather         Stroke 1966   • Hyperlipidemia Brother    • Mental illness Sister         Bipolar   • Stroke Sister          2022   • Other Daughter         Seizure disorder (unknown)      reports that she quit smoking about 35 years ago. Her smoking use included cigarettes. She started smoking about 45 years ago. She has a 5.00 pack-year smoking history. She has never used smokeless tobacco. She reports current alcohol use. She reports that she does not use drugs.      Current Outpatient Medications:   •  aspirin 81 MG EC tablet, Take 81 mg by mouth., Disp: , Rfl:   •  atorvastatin (LIPITOR) 20 MG tablet, Take 1 tablet by mouth 3 (Three) Times a Week., Disp: 36 tablet, Rfl: 3  •  Calcium Carb-Cholecalciferol (Calcium Carbonate-Vitamin D3) 600-400 MG-UNIT tablet, Take  by mouth., Disp: , Rfl:   •  irbesartan (AVAPRO) 150 MG tablet, Take 1 tablet by mouth Daily., Disp: 90 tablet, Rfl: 3  •  metFORMIN (GLUCOPHAGE) 500 MG tablet, Take 1 tablet by mouth 2 (Two) Times a Day With Meals., Disp: 180 tablet, Rfl: 3  •  cetirizine (zyrTEC) 10 MG tablet, Take 10 mg by mouth Daily., Disp: , Rfl:     OBJECTIVE:  /80 (BP Location: Left arm, Patient Position: Sitting, Cuff Size: Adult)   Pulse 104   Temp 98 °F (36.7 °C)   Resp 16   Ht 162.6 cm (64\")   Wt 82.7 kg (182 lb 6.4 oz)   SpO2 98%   BMI 31.31 kg/m²    Physical Exam  Exam conducted with a chaperone present.   Constitutional:       General: She is not in acute distress.     Appearance: She is well-developed.   HENT:      Head: Normocephalic and atraumatic.      Right Ear: " Tympanic membrane and external ear normal. There is no impacted cerumen.      Left Ear: Tympanic membrane and external ear normal. There is no impacted cerumen.   Eyes:      General: No scleral icterus.     Extraocular Movements: Extraocular movements intact.   Neck:      Trachea: No tracheal deviation.   Cardiovascular:      Rate and Rhythm: Normal rate and regular rhythm.      Heart sounds: Normal heart sounds. No murmur heard.  Pulmonary:      Effort: Pulmonary effort is normal. No accessory muscle usage or respiratory distress.      Breath sounds: Normal breath sounds. No wheezing.   Chest:   Breasts:     Right: No swelling, inverted nipple or mass.      Left: No swelling, inverted nipple or mass.   Abdominal:      General: There is no distension.      Palpations: Abdomen is soft.      Tenderness: There is no abdominal tenderness.   Musculoskeletal:         General: Normal range of motion.      Cervical back: Normal range of motion and neck supple.      Right lower leg: No edema.      Left lower leg: No edema.   Lymphadenopathy:      Upper Body:      Right upper body: No axillary adenopathy.      Left upper body: No axillary adenopathy.   Skin:     General: Skin is warm and dry.      Nails: There is no clubbing.   Neurological:      Mental Status: She is alert and oriented to person, place, and time.      Coordination: Coordination normal.      Gait: Gait normal.   Psychiatric:         Mood and Affect: Mood normal. Mood is not anxious or depressed.         Behavior: Behavior normal.             Assessment/Plan     Diagnoses and all orders for this visit:    1. Annual visit for general adult medical examination with abnormal findings (Primary)  Immunizations:      - Tetanus: Received in 2021      - Influenza: Received in 2022      - Prevnar: Once after age 65      - Shingrix: Received in 2021      - COVID: Up to date with bivalent booster.  CRC screening: Colonoscopy done 4 years ago with 10 year  recall.  Mammogram: was done on approximately 11/2021 and the result was: Birads I (Normal).  PAP: discontinued secondary to benign hysterectomy.  DEXA: DEXA scan at 63     2. Essential hypertension  -     irbesartan (AVAPRO) 150 MG tablet; Take 1 tablet by mouth Daily.  Dispense: 90 tablet; Refill: 3  Fair control, BP goal for age is <140/90 per JNC 8 guidelines, continue current medications and ambulatory readings noted.     3. Impaired glucose tolerance  -     metFORMIN (GLUCOPHAGE) 500 MG tablet; Take 1 tablet by mouth 2 (Two) Times a Day With Meals.  Dispense: 180 tablet; Refill: 3  Continue metformin and dietary modifications.     4. Pure hypercholesterolemia  -     atorvastatin (LIPITOR) 20 MG tablet; Take 1 tablet by mouth 3 (Three) Times a Week.  Dispense: 36 tablet; Refill: 3  Stable on moderate intensity statin therapy per ACC/AHA guidelines.       An After Visit Summary was printed and given to the patient at discharge.  Return in about 10 months (around 10/12/2023) for Annual physical.         Alex Torres D.O. 12/12/2022   Electronically signed.

## 2022-12-29 ENCOUNTER — OFFICE VISIT (OUTPATIENT)
Dept: INTERNAL MEDICINE | Facility: CLINIC | Age: 62
End: 2022-12-29

## 2022-12-29 VITALS
BODY MASS INDEX: 31.41 KG/M2 | DIASTOLIC BLOOD PRESSURE: 81 MMHG | HEART RATE: 92 BPM | WEIGHT: 184 LBS | RESPIRATION RATE: 16 BRPM | SYSTOLIC BLOOD PRESSURE: 151 MMHG | HEIGHT: 64 IN | OXYGEN SATURATION: 100 %

## 2022-12-29 DIAGNOSIS — J22 LOWER RESPIRATORY INFECTION: Primary | ICD-10-CM

## 2022-12-29 PROCEDURE — 99213 OFFICE O/P EST LOW 20 MIN: CPT | Performed by: NURSE PRACTITIONER

## 2022-12-29 RX ORDER — AZITHROMYCIN 250 MG/1
TABLET, FILM COATED ORAL
Qty: 6 TABLET | Refills: 0 | Status: SHIPPED | OUTPATIENT
Start: 2022-12-29

## 2022-12-29 RX ORDER — ALBUTEROL SULFATE 90 UG/1
2 AEROSOL, METERED RESPIRATORY (INHALATION) EVERY 4 HOURS PRN
Qty: 8 G | Refills: 0 | Status: SHIPPED | OUTPATIENT
Start: 2022-12-29

## 2022-12-29 NOTE — PROGRESS NOTES
Chief Complaint   Patient presents with   • Cough   • URI     Chest congestion        HPI     Cheli Bah is a 62 y.o. female presents for the above problems. Symptoms have been present for 10 days. She is taking mucinex for productive cough. She has taken 2 COVID tests at home which were negative.          ROS:  Review of Systems   Constitutional: Negative for chills and fever.   HENT: Positive for postnasal drip and sore throat. Negative for ear pain and rhinorrhea.    Respiratory: Positive for cough and wheezing. Negative for shortness of breath.    Cardiovascular: Negative for chest pain.   Musculoskeletal: Negative for myalgias.   Skin: Negative for rash.   Neurological: Positive for headaches.          reports that she quit smoking about 35 years ago. Her smoking use included cigarettes. She started smoking about 46 years ago. She has a 5.00 pack-year smoking history. She has never used smokeless tobacco. She reports current alcohol use. She reports that she does not use drugs.    Current Outpatient Medications:   •  aspirin 81 MG EC tablet, Take 81 mg by mouth., Disp: , Rfl:   •  atorvastatin (LIPITOR) 20 MG tablet, Take 1 tablet by mouth 3 (Three) Times a Week., Disp: 36 tablet, Rfl: 3  •  Calcium Carb-Cholecalciferol (Calcium Carbonate-Vitamin D3) 600-400 MG-UNIT tablet, Take  by mouth., Disp: , Rfl:   •  cetirizine (zyrTEC) 10 MG tablet, Take 10 mg by mouth Daily., Disp: , Rfl:   •  irbesartan (AVAPRO) 150 MG tablet, Take 1 tablet by mouth Daily., Disp: 90 tablet, Rfl: 3  •  metFORMIN (GLUCOPHAGE) 500 MG tablet, Take 1 tablet by mouth 2 (Two) Times a Day With Meals., Disp: 180 tablet, Rfl: 3  •  albuterol sulfate  (90 Base) MCG/ACT inhaler, Inhale 2 puffs Every 4 (Four) Hours As Needed for Wheezing., Disp: 8 g, Rfl: 0  •  azithromycin (Zithromax Z-Haroon) 250 MG tablet, Take 2 tablets by mouth on day 1, then 1 tablet daily on days 2-5, Disp: 6 tablet, Rfl: 0    OBJECTIVE:  /81 (BP Location: Left  "arm, Patient Position: Sitting, Cuff Size: Other (Comment))   Pulse 92   Resp 16   Ht 162.6 cm (64\")   Wt 83.5 kg (184 lb)   SpO2 100%   BMI 31.58 kg/m²    Physical Exam  Constitutional:       General: She is not in acute distress.  Cardiovascular:      Rate and Rhythm: Normal rate and regular rhythm.      Heart sounds: Normal heart sounds.   Pulmonary:      Breath sounds: Wheezing present.      Comments: Wheeze, improved with cough         ASSESSMENT/PLAN:     Diagnoses and all orders for this visit:    1. Lower respiratory infection (Primary)  -     azithromycin (Zithromax Z-Haroon) 250 MG tablet; Take 2 tablets by mouth on day 1, then 1 tablet daily on days 2-5  Dispense: 6 tablet; Refill: 0  -     albuterol sulfate  (90 Base) MCG/ACT inhaler; Inhale 2 puffs Every 4 (Four) Hours As Needed for Wheezing.  Dispense: 8 g; Refill: 0  Continue mucinex with azithromycin for improvement of symptoms. Albuterol if needed. She has taken 2 COVID tests and declines flu test today.     An After Visit Summary was printed and given to the patient at discharge.  Return if symptoms worsen or fail to improve.          Katherine Meier, TERENCE 12/29/2022   Electronically signed.  "

## 2023-04-17 ENCOUNTER — OFFICE VISIT (OUTPATIENT)
Dept: INTERNAL MEDICINE | Facility: CLINIC | Age: 63
End: 2023-04-17
Payer: COMMERCIAL

## 2023-04-17 VITALS
DIASTOLIC BLOOD PRESSURE: 80 MMHG | RESPIRATION RATE: 16 BRPM | HEART RATE: 80 BPM | TEMPERATURE: 99.1 F | OXYGEN SATURATION: 100 % | SYSTOLIC BLOOD PRESSURE: 148 MMHG | WEIGHT: 188 LBS | BODY MASS INDEX: 32.1 KG/M2 | HEIGHT: 64 IN

## 2023-04-17 DIAGNOSIS — I10 WHITE COAT SYNDROME WITH DIAGNOSIS OF HYPERTENSION: ICD-10-CM

## 2023-04-17 DIAGNOSIS — Z01.419 ENCOUNTER FOR WELL WOMAN EXAM: Primary | ICD-10-CM

## 2023-04-17 DIAGNOSIS — J30.2 SEASONAL ALLERGIES: ICD-10-CM

## 2023-04-17 DIAGNOSIS — J34.89 RHINORRHEA: Primary | ICD-10-CM

## 2023-04-17 RX ORDER — KETOCONAZOLE 20 MG/ML
SHAMPOO TOPICAL
COMMUNITY
Start: 2022-12-29

## 2023-04-17 NOTE — PROGRESS NOTES
Chief Complaint   Patient presents with   • Allergies     Sinus congestion, sinus pressure        HPI     Cheli Bah is a 62 y.o. female presents for the above problem. Symptoms began last week. She is actually feeling better today though she still has postnasal drip and sinus pressure. She has taken tylenol as needed for headache and continues daily antihistamine. She denies infectious symptoms.          ROS:  Review of Systems   Constitutional: Negative for fatigue and fever.   HENT: Positive for postnasal drip, sinus pressure and sneezing. Negative for congestion, ear pain and sinus pain.    Respiratory: Negative for cough and shortness of breath.    Cardiovascular: Negative.           reports that she quit smoking about 35 years ago. Her smoking use included cigarettes. She started smoking about 46 years ago. She has a 5.00 pack-year smoking history. She has never used smokeless tobacco. She reports current alcohol use. She reports that she does not use drugs.    Current Outpatient Medications:   •  albuterol sulfate  (90 Base) MCG/ACT inhaler, Inhale 2 puffs Every 4 (Four) Hours As Needed for Wheezing., Disp: 8 g, Rfl: 0  •  aspirin 81 MG EC tablet, Take 1 tablet by mouth., Disp: , Rfl:   •  atorvastatin (LIPITOR) 20 MG tablet, Take 1 tablet by mouth 3 (Three) Times a Week., Disp: 36 tablet, Rfl: 3  •  Calcium Carb-Cholecalciferol (Calcium Carbonate-Vitamin D3) 600-400 MG-UNIT tablet, Take  by mouth., Disp: , Rfl:   •  cetirizine (zyrTEC) 10 MG tablet, Take 1 tablet by mouth Daily., Disp: , Rfl:   •  irbesartan (AVAPRO) 150 MG tablet, Take 1 tablet by mouth Daily., Disp: 90 tablet, Rfl: 3  •  ketoconazole (NIZORAL) 2 % shampoo, APPLY SHAMPOO TO SCALP 2 TO 3 TIMES A WEEK, LEAVE IN FOR 10 MINUTES THEN RINSE OUT, Disp: , Rfl:   •  metFORMIN (GLUCOPHAGE) 500 MG tablet, Take 1 tablet by mouth 2 (Two) Times a Day With Meals., Disp: 180 tablet, Rfl: 3    OBJECTIVE:  /80 (BP Location: Left arm, Patient  "Position: Sitting, Cuff Size: Other (Comment)) Comment (Cuff Size): automated  Pulse 80   Temp 99.1 °F (37.3 °C) (Oral)   Resp 16   Ht 162.6 cm (64\")   Wt 85.3 kg (188 lb)   SpO2 100%   BMI 32.27 kg/m²    Physical Exam  Constitutional:       General: She is not in acute distress.  HENT:      Right Ear: Tympanic membrane normal.      Left Ear: Tympanic membrane normal.      Nose:      Right Sinus: Maxillary sinus tenderness (mild) present.      Left Sinus: Maxillary sinus tenderness (mild) present.      Mouth/Throat:      Pharynx: No posterior oropharyngeal erythema.      Tonsils: No tonsillar exudate.   Cardiovascular:      Rate and Rhythm: Normal rate and regular rhythm.      Heart sounds: Normal heart sounds.   Pulmonary:      Effort: Pulmonary effort is normal.      Breath sounds: Normal breath sounds.         ASSESSMENT/PLAN:     Diagnoses and all orders for this visit:    1. Rhinorrhea (Primary)  2. Seasonal allergies  Continue daily antihistamine, may use benadryl at night if needed. She has a history of nosebleeds so she is cautious to use nasal sprays. Symptoms seem to be improving today, continue supportive care. Should symptoms worsen, she will let us know.      3. White coat syndrome with hypertension   She monitors blood pressure closely and says it is normal at home. Continue current medications as ordered.     An After Visit Summary was printed and given to the patient at discharge.  Return if symptoms worsen or fail to improve.          TERENCE Bruno 4/17/2023   Electronically signed.  "

## 2023-05-12 ENCOUNTER — TELEPHONE (OUTPATIENT)
Dept: INTERNAL MEDICINE | Facility: CLINIC | Age: 63
End: 2023-05-12

## 2023-05-12 NOTE — TELEPHONE ENCOUNTER
Caller: BELEN JORGE LUIS    Relationship: Emergency Contact    Best call back number: [phone]    What medication are you requesting:     PCP RECOMMENDATION - EXPOSURE TO POSITIVE COVID WITH SPOUSE    What are your current symptoms:     SORE THROAT, ACHY    How long have you been experiencing symptoms:     TODAY. SPOUSE TESTED POSITIVE LAST NIGHT    Have you had these symptoms before:    [] Yes  [x] No    Have you been treated for these symptoms before:   [] Yes  [x] No    If a prescription is needed, what is your preferred pharmacy and phone number:          Additional notes:

## 2023-05-25 ENCOUNTER — OFFICE VISIT (OUTPATIENT)
Dept: OBSTETRICS AND GYNECOLOGY | Facility: CLINIC | Age: 63
End: 2023-05-25
Payer: COMMERCIAL

## 2023-05-25 VITALS
HEIGHT: 64 IN | SYSTOLIC BLOOD PRESSURE: 158 MMHG | BODY MASS INDEX: 32.44 KG/M2 | WEIGHT: 190 LBS | DIASTOLIC BLOOD PRESSURE: 96 MMHG

## 2023-05-25 DIAGNOSIS — N39.3 SUI (STRESS URINARY INCONTINENCE, FEMALE): ICD-10-CM

## 2023-05-25 DIAGNOSIS — Z01.419 WOMEN'S ANNUAL ROUTINE GYNECOLOGICAL EXAMINATION: Primary | ICD-10-CM

## 2023-05-25 DIAGNOSIS — Z12.31 ENCOUNTER FOR SCREENING MAMMOGRAM FOR MALIGNANT NEOPLASM OF BREAST: ICD-10-CM

## 2023-05-25 RX ORDER — NIRMATRELVIR AND RITONAVIR 300-100 MG
KIT ORAL
COMMUNITY
Start: 2023-05-12

## 2023-05-25 NOTE — PROGRESS NOTES
Chief Complaint   Patient presents with    Gynecologic Exam     Patient is new to our office and here to establish GYN care.  She reports her last well GYN exam was with PCP.  S/p hysterectomy 2003, ovaries still intact. [place] and was BIRADS Cat 1 negative.  Last DEXA 6/2019 was normal.  Patient denies urinary incontinence, pelvic pain, abnormal vaginal bleeding or discharge, and voices no other complaints.        History:  Cheli Bah is a 62 y.o. female here today for annual GYN examination. She is menopausal and has not had any recent abnormal Pap smears. She denies any vaginal discharge or bleeding. She is not on hormone replacement therapy.  Her last mammogram was in 2021. She has no specific complaints today and denies abdominal or pelvic pain.       Hysterectomy due to heavy bleeding     Stress urinary incontinence.         ROS:  Review of Systems   Constitutional: Negative.    HENT: Negative.     Eyes: Negative.    Respiratory: Negative.     Cardiovascular: Negative.    Gastrointestinal: Negative.    Endocrine: Negative.    Genitourinary:  Positive for urinary incontinence.   Musculoskeletal: Negative.    Skin: Negative.    Neurological: Negative.    Psychiatric/Behavioral: Negative.       Allergies   Allergen Reactions    Amoxicillin-Pot Clavulanate Diarrhea    Drug Ingredient [Levofloxacin] GI Intolerance    Metoclopramide Anxiety     Past Medical History:   Diagnosis Date    Allergic     Seasonal/25+ yrs    Cataract Jan 2022    Removed Feb 2022    Diverticulosis June 2011    Female infertility early 1980s    7+ years of trying before 1st pregnancy. It was a “combination” problem. I was only ovulating 4 or 5 times per year, and my  had a low sperm count.    Gestational diabetes Summer 1987    History of medical problems 2002    Thyroid  nodules    Hypercholesteremia     Hypertension     Varicella Childhood     Past Surgical History:   Procedure Laterality Date    CATARACT  EXTRACTION  2022     SECTION      COLONOSCOPY  2018    2nd one (@ 48 and 59 y/o)    EYE SURGERY  2022    Cataracts removed    HYSTERECTOMY      KNEE ARTHROSCOPY Bilateral     2015    LYMPH NODE BIOPSY  Aug 2012    VAGINAL HYSTERECTOMY  2003    WISDOM TOOTH EXTRACTION  1970s     Family History   Problem Relation Age of Onset    Pneumonia Father         passed away with Bacteria Pneumonia    Early death Father         Pneumonia ( at 34 y/o)    Hypertension Mother     Rheum arthritis Mother     Arthritis Mother         RA    Hearing loss Mother     Hyperlipidemia Mother     Hyperlipidemia Brother     Stroke Sister     Mental illness Sister         Bipolar    Stroke Sister          2022    Seizures Daughter         unknown    Other Daughter         Seizure disorder (unknown)    Stroke Paternal Grandfather         Stroke 1966    Stomach cancer Paternal Grandmother         passed at 86    Cancer Paternal Grandmother         Stomach ( at 87 y/o)    No Known Problems Maternal Grandmother     No Known Problems Maternal Grandfather     Breast cancer Neg Hx     Ovarian cancer Neg Hx     Uterine cancer Neg Hx     Colon cancer Neg Hx     Melanoma Neg Hx       reports that she quit smoking about 36 years ago. Her smoking use included cigarettes. She started smoking about 46 years ago. She has a 5.00 pack-year smoking history. She has never used smokeless tobacco. She reports current alcohol use. She reports that she does not use drugs.      Current Outpatient Medications:     albuterol sulfate  (90 Base) MCG/ACT inhaler, Inhale 2 puffs Every 4 (Four) Hours As Needed for Wheezing., Disp: 8 g, Rfl: 0    aspirin 81 MG EC tablet, Take 1 tablet by mouth., Disp: , Rfl:     atorvastatin (LIPITOR) 20 MG tablet, Take 1 tablet by mouth 3 (Three) Times a Week., Disp: 36 tablet, Rfl: 3    Calcium Carb-Cholecalciferol (Calcium Carbonate-Vitamin D3) 600-400 MG-UNIT tablet, Take  by  "mouth., Disp: , Rfl:     cetirizine (zyrTEC) 10 MG tablet, Take 1 tablet by mouth Daily., Disp: , Rfl:     irbesartan (AVAPRO) 150 MG tablet, Take 1 tablet by mouth Daily., Disp: 90 tablet, Rfl: 3    ketoconazole (NIZORAL) 2 % shampoo, APPLY SHAMPOO TO SCALP 2 TO 3 TIMES A WEEK, LEAVE IN FOR 10 MINUTES THEN RINSE OUT, Disp: , Rfl:     metFORMIN (GLUCOPHAGE) 500 MG tablet, Take 1 tablet by mouth 2 (Two) Times a Day With Meals., Disp: 180 tablet, Rfl: 3    Paxlovid, 300/100, 20 x 150 MG & 10 x 100MG tablet therapy pack tablet, TAKE 3 TABLETS BY MOUTH TWICE DAILY FOR 5 DAYS, Disp: , Rfl:     OBJECTIVE:  /96   Ht 162.6 cm (64\")   Wt 86.2 kg (190 lb)   BMI 32.61 kg/m²    Physical Exam  Exam conducted with a chaperone present.   Constitutional:       Appearance: She is well-developed.   HENT:      Head: Normocephalic and atraumatic.   Eyes:      General: Lids are normal.      Conjunctiva/sclera: Conjunctivae normal.      Pupils: Pupils are equal, round, and reactive to light.   Neck:      Thyroid: No thyromegaly.   Cardiovascular:      Rate and Rhythm: Normal rate and regular rhythm.      Heart sounds: Normal heart sounds.   Pulmonary:      Effort: Pulmonary effort is normal.      Breath sounds: Normal breath sounds.   Chest:   Breasts:     Breasts are symmetrical.      Right: No inverted nipple, mass, nipple discharge, skin change or tenderness.      Left: No inverted nipple, mass, nipple discharge, skin change or tenderness.   Abdominal:      General: Bowel sounds are normal.      Palpations: Abdomen is soft.   Genitourinary:     Exam position: Supine.      Labia:         Right: No rash, tenderness, lesion or injury.         Left: No rash, tenderness, lesion or injury.       Vagina: No signs of injury and foreign body. No vaginal discharge, erythema, tenderness or bleeding.      Uterus: Absent.       Adnexa:         Right: No mass, tenderness or fullness.          Left: No mass, tenderness or fullness.        " Rectum: Normal. No tenderness or external hemorrhoid.      Comments: Cervix and uterus surgically absent.     Musculoskeletal:         General: Normal range of motion.      Cervical back: Normal range of motion and neck supple.   Skin:     General: Skin is warm and dry.   Neurological:      Mental Status: She is alert and oriented to person, place, and time.       Assessment/Plan    Diagnoses and all orders for this visit:    1. Women's annual routine gynecological examination (Primary)  Immunizations:      - Tetanus: Unknown or >10 years ago. Recommend to have at pharmacy or on injury.      - Influenza: recommended annually      - Pneumovax:once after age 65      - Prevnar: Once after age 65      - Zostavax: Once after age 60  Colon Cancer Screening: Due 2028  Mammogram: order placed.   PAP: s/p hysterectomy  DEXA: DEXA scan at 65  COVID vaccine information is available at vaccine.[place].gov        2. Encounter for screening mammogram for malignant neoplasm of breast  -     Mammo Screening Digital Tomosynthesis Bilateral With CAD    3. FRANKIE (stress urinary incontinence, female)  Comments:  mild at this time. discussed referral to PT but declines at this time.        She will schedule a mammogram.  She will followup in one year or sooner if needed.     An After Visit Summary was printed and given to the patient at discharge.  Return in about 1 year (around 5/25/2024) for Next scheduled follow up.          Miladis PRATT 5/25/2023   Electronically signed

## 2023-08-13 LAB
NCCN CRITERIA FLAG: NORMAL
TYRER CUZICK SCORE: 9.1

## 2023-09-12 ENCOUNTER — HOSPITAL ENCOUNTER (OUTPATIENT)
Dept: MAMMOGRAPHY | Facility: HOSPITAL | Age: 63
Discharge: HOME | End: 2023-09-12
Admitting: NURSE PRACTITIONER
Payer: COMMERCIAL

## 2023-09-12 PROCEDURE — 77063 BREAST TOMOSYNTHESIS BI: CPT

## 2023-09-12 PROCEDURE — 77067 SCR MAMMO BI INCL CAD: CPT

## 2023-09-21 ENCOUNTER — OFFICE VISIT (OUTPATIENT)
Dept: INTERNAL MEDICINE | Facility: CLINIC | Age: 63
End: 2023-09-21
Payer: COMMERCIAL

## 2023-09-21 ENCOUNTER — LAB REQUISITION (OUTPATIENT)
Dept: LAB | Facility: HOSPITAL | Age: 63
End: 2023-09-21

## 2023-09-21 VITALS
SYSTOLIC BLOOD PRESSURE: 147 MMHG | DIASTOLIC BLOOD PRESSURE: 92 MMHG | HEIGHT: 64 IN | HEART RATE: 104 BPM | TEMPERATURE: 99.3 F | BODY MASS INDEX: 30.73 KG/M2 | RESPIRATION RATE: 16 BRPM | WEIGHT: 180 LBS | OXYGEN SATURATION: 99 %

## 2023-09-21 DIAGNOSIS — R50.9 FEVER, UNSPECIFIED FEVER CAUSE: Primary | ICD-10-CM

## 2023-09-21 DIAGNOSIS — E66.09 CLASS 1 OBESITY DUE TO EXCESS CALORIES WITHOUT SERIOUS COMORBIDITY WITH BODY MASS INDEX (BMI) OF 30.0 TO 30.9 IN ADULT: ICD-10-CM

## 2023-09-21 DIAGNOSIS — H65.93 FLUID LEVEL BEHIND TYMPANIC MEMBRANE OF BOTH EARS: ICD-10-CM

## 2023-09-21 LAB
FLUAV RNA RESP QL NAA+PROBE: NOT DETECTED
FLUBV RNA RESP QL NAA+PROBE: NOT DETECTED
RSV RNA NPH QL NAA+NON-PROBE: NOT DETECTED
SARS-COV-2 RNA RESP QL NAA+PROBE: NOT DETECTED

## 2023-09-21 PROCEDURE — 99213 OFFICE O/P EST LOW 20 MIN: CPT | Performed by: NURSE PRACTITIONER

## 2023-09-21 PROCEDURE — 87637 SARSCOV2&INF A&B&RSV AMP PRB: CPT | Performed by: NURSE PRACTITIONER

## 2023-09-21 RX ORDER — METHYLPREDNISOLONE 4 MG/1
TABLET ORAL
Qty: 21 TABLET | Refills: 0 | Status: SHIPPED | OUTPATIENT
Start: 2023-09-21

## 2023-09-21 NOTE — PROGRESS NOTES
" TERENCE Yañez  [facility]   Family Medicine  [address]  [place] [address]  Phone: [phone]  Fax: [phone]         Chief Complaint:  Chief Complaint   Patient presents with    Ear Problem     Popping, feel like \"fluid is in there\"    Cough    Sinus Problem     Post nasal drip        History:  Cheli Bah is a 62 y.o. female that is an established patient. She  is here for evaluation of the above complaint.    Cough    Sinus Problem  Associated symptoms include coughing.      Vanessa was treated in  9-9-2023 for URI, acute cough with Zithromax, flonase and cough syrup. She has done much better since then, the cough is hanging on and she has bilateral ear fullness, popping, bubbling. Fever of 99.1 today. She is no longer coughing up green sputum.    She has been in [place] making arrangements to sell her mom's house; her mom is in a nursing home and Vanessa is POA.           ROS:  Review of Systems   Respiratory:  Positive for cough.        reports that she quit smoking about 36 years ago. Her smoking use included cigarettes. She started smoking about 46 years ago. She has a 5.00 pack-year smoking history. She has never used smokeless tobacco. She reports current alcohol use. She reports that she does not use drugs.    Current Outpatient Medications   Medication Instructions    albuterol sulfate  (90 Base) MCG/ACT inhaler 2 puffs, Inhalation, Every 4 Hours PRN    aspirin 81 mg, Oral    atorvastatin (LIPITOR) 20 mg, Oral, 3 Times Weekly    brompheniramine-pseudoephedrine-DM 30-2-10 MG/5ML syrup 5 mL, Oral, 4 Times Daily PRN    Calcium Carb-Cholecalciferol (Calcium Carbonate-Vitamin D3) 600-400 MG-UNIT tablet Oral    cetirizine (ZYRTEC) 10 mg, Oral, Daily    fluticasone (FLONASE) 50 MCG/ACT nasal spray 2 sprays, Nasal, Daily, 2 puffs each nostril    irbesartan (AVAPRO) 150 mg, Oral, Daily    ketoconazole (NIZORAL) 2 % shampoo APPLY SHAMPOO TO SCALP 2 TO 3 TIMES A WEEK, " "LEAVE IN FOR 10 MINUTES THEN RINSE OUT    metFORMIN (GLUCOPHAGE) 500 mg, Oral, 2 Times Daily With Meals    methylPREDNISolone (MEDROL) 4 MG dose pack Take as directed on package instructions.       OBJECTIVE:  /92 (BP Location: Left arm, Patient Position: Sitting, Cuff Size: Other (Comment)) Comment (Cuff Size): automated  Pulse 104   Temp 99.3 °F (37.4 °C) (Temporal)   Resp 16   Ht 162.6 cm (64\")   Wt 81.6 kg (180 lb)   SpO2 99%   BMI 30.90 kg/m²    Physical Exam  Vitals and nursing note reviewed.   Constitutional:       Appearance: Normal appearance.   HENT:      Head: Normocephalic and atraumatic.      Right Ear: Hearing normal. No middle ear effusion.      Left Ear: Hearing normal.  No middle ear effusion.      Ears:      Comments: Fluid level, bilateral TM     Nose: Nose normal.      Mouth/Throat:      Mouth: Mucous membranes are moist.   Eyes:      Pupils: Pupils are equal, round, and reactive to light.   Cardiovascular:      Rate and Rhythm: Normal rate and regular rhythm.   Pulmonary:      Effort: Pulmonary effort is normal.      Breath sounds: Normal breath sounds. No stridor. No wheezing or rhonchi.   Skin:     General: Skin is warm and dry.   Neurological:      General: No focal deficit present.      Mental Status: She is alert and oriented to person, place, and time.   Psychiatric:         Mood and Affect: Mood normal.         Behavior: Behavior normal.       Procedures    Assessment/Plan:     Diagnoses and all orders for this visit:    1. Fever, unspecified fever cause (Primary)  -     COVID-19, FLU A/B, RSV PCR - Swab, Nasopharynx; Future    2. Fluid level behind tympanic membrane of both ears  -     methylPREDNISolone (MEDROL) 4 MG dose pack; Take as directed on package instructions.  Dispense: 21 tablet; Refill: 0    3. Class 1 obesity due to excess calories without serious comorbidity with body mass index (BMI) of 30.0 to 30.9 in adult      BMI is >= 30 and <35. (Class 1 Obesity). The " following options were offered after discussion;: exercise counseling/recommendations and nutrition counseling/recommendations       An After Visit Summary was printed and given to the patient at discharge.  No follow-ups on file.       There are no Patient Instructions on file for this visit.      Discussion:     She is returning next month for annual physical    I spent 25 minutes caring for Cheli on this date of service. This time includes time spent by me in the following activities: preparing for the visit, reviewing tests, obtaining and/or reviewing a separately obtained history, performing a medically appropriate examination and/or evaluation, counseling and educating the patient/family/caregiver, ordering medications, tests, or procedures, and documenting information in the medical record     Emilee PRATT 9/21/2023   Electronically signed.Answers submitted by the patient for this visit:  Primary Reason for Visit (Submitted on 9/21/2023)  What is the primary reason for your visit?: Cough

## 2023-10-11 PROBLEM — E11.9 TYPE 2 DIABETES MELLITUS WITHOUT COMPLICATION, WITHOUT LONG-TERM CURRENT USE OF INSULIN: Status: ACTIVE | Noted: 2022-11-02

## 2023-10-13 ENCOUNTER — OFFICE VISIT (OUTPATIENT)
Dept: INTERNAL MEDICINE | Facility: CLINIC | Age: 63
End: 2023-10-13
Payer: COMMERCIAL

## 2023-10-13 VITALS
BODY MASS INDEX: 31.41 KG/M2 | HEIGHT: 64 IN | SYSTOLIC BLOOD PRESSURE: 153 MMHG | OXYGEN SATURATION: 98 % | WEIGHT: 184 LBS | HEART RATE: 106 BPM | DIASTOLIC BLOOD PRESSURE: 97 MMHG

## 2023-10-13 DIAGNOSIS — Z23 FLU VACCINE NEED: ICD-10-CM

## 2023-10-13 DIAGNOSIS — I10 ESSENTIAL HYPERTENSION: ICD-10-CM

## 2023-10-13 DIAGNOSIS — E11.65 TYPE 2 DIABETES MELLITUS WITH HYPERGLYCEMIA, WITHOUT LONG-TERM CURRENT USE OF INSULIN: ICD-10-CM

## 2023-10-13 DIAGNOSIS — E78.00 PURE HYPERCHOLESTEROLEMIA: ICD-10-CM

## 2023-10-13 DIAGNOSIS — R73.02 IMPAIRED GLUCOSE TOLERANCE: ICD-10-CM

## 2023-10-13 DIAGNOSIS — Z00.00 ANNUAL PHYSICAL EXAM: Primary | ICD-10-CM

## 2023-10-13 RX ORDER — CETIRIZINE HYDROCHLORIDE 10 MG/1
10 TABLET ORAL DAILY
Qty: 90 TABLET | Refills: 3 | Status: SHIPPED | OUTPATIENT
Start: 2023-10-13

## 2023-10-13 RX ORDER — ATORVASTATIN CALCIUM 20 MG/1
20 TABLET, FILM COATED ORAL 3 TIMES WEEKLY
Qty: 36 TABLET | Refills: 3 | Status: SHIPPED | OUTPATIENT
Start: 2023-10-13

## 2023-10-13 RX ORDER — IRBESARTAN 150 MG/1
150 TABLET ORAL DAILY
Qty: 90 TABLET | Refills: 3 | Status: SHIPPED | OUTPATIENT
Start: 2023-10-13

## 2023-10-13 NOTE — PROGRESS NOTES
TERENCE Yañez  [facility]   Family Medicine  [address]  [place] [address]  Phone: [phone]  Fax: [phone]         Chief Complaint:  Chief Complaint   Patient presents with    Annual Exam        History:  Cheli Bah is a 62 y.o. female that is an established patient. She  is here for evaluation of the above complaint and management of chronic conditions.    HPI     Vanessa Bah is here in return for annual exam. She has had a lot of recent stress, as noted in previous visit notes, cleaning out her mom's house in [place]. Stress is much reduced now.    A1c is increased to 6.5%, up from 6%. She continues Metformin 500mg bid. She feels like she's healthiest at 170#, and is working to return to that weight. She would like to hold off additional intervention for diabetes for now.    No other c/o; she was treated for serous OM last month. Blood pressures are good at home, feels like BP is just up when she comes to clinic appointments.     131/71, 116/70, 130/73, 116/66           ROS:  Review of Systems   Constitutional: Negative.    HENT: Negative.     Eyes: Negative.    Respiratory: Negative.     Cardiovascular: Negative.    Gastrointestinal: Negative.    Endocrine: Negative.    Genitourinary: Negative.    Musculoskeletal: Negative.    Skin: Negative.    Allergic/Immunologic: Negative.    Neurological: Negative.    Hematological: Negative.    Psychiatric/Behavioral: Negative.           reports that she quit smoking about 36 years ago. Her smoking use included cigarettes. She started smoking about 46 years ago. She has a 5.00 pack-year smoking history. She has never used smokeless tobacco. She reports current alcohol use. She reports that she does not use drugs.    Current Outpatient Medications   Medication Instructions    aspirin 81 mg, Oral    atorvastatin (LIPITOR) 20 mg, Oral, 3 Times Weekly    Calcium Carb-Cholecalciferol (Calcium Carbonate-Vitamin D3) 600-400 MG-UNIT  "tablet Oral    cetirizine (ZYRTEC) 10 mg, Oral, Daily    irbesartan (AVAPRO) 150 mg, Oral, Daily    ketoconazole (NIZORAL) 2 % shampoo APPLY SHAMPOO TO SCALP 2 TO 3 TIMES A WEEK, LEAVE IN FOR 10 MINUTES THEN RINSE OUT    metFORMIN (GLUCOPHAGE) 500 mg, Oral, 2 Times Daily With Meals       OBJECTIVE:  /97 (BP Location: Left arm, Patient Position: Sitting, Cuff Size: Adult)   Pulse 106   Ht 162.6 cm (64\")   Wt 83.5 kg (184 lb)   SpO2 98%   BMI 31.58 kg/mý    Physical Exam  Constitutional:       Appearance: Normal appearance.   HENT:      Head: Normocephalic and atraumatic.      Right Ear: Tympanic membrane, ear canal and external ear normal.      Left Ear: Tympanic membrane, ear canal and external ear normal.      Nose: Nose normal.      Mouth/Throat:      Mouth: Mucous membranes are moist.      Pharynx: Oropharynx is clear.   Eyes:      Extraocular Movements: Extraocular movements intact.      Conjunctiva/sclera: Conjunctivae normal.      Pupils: Pupils are equal, round, and reactive to light.   Cardiovascular:      Rate and Rhythm: Normal rate and regular rhythm.      Pulses: Normal pulses.           Dorsalis pedis pulses are 2+ on the right side and 2+ on the left side.        Posterior tibial pulses are 2+ on the right side and 2+ on the left side.      Heart sounds: Normal heart sounds.   Pulmonary:      Effort: Pulmonary effort is normal.      Breath sounds: Normal breath sounds.   Abdominal:      General: Bowel sounds are normal.      Palpations: Abdomen is soft.   Musculoskeletal:         General: Normal range of motion.      Cervical back: Normal range of motion and neck supple.   Feet:      Right foot:      Protective Sensation: 10 sites tested.  10 sites sensed.      Skin integrity: Skin integrity normal.      Toenail Condition: Right toenails are normal.      Left foot:      Protective Sensation: 10 sites tested.  10 sites sensed.      Skin integrity: Skin integrity normal.      Toenail " Condition: Left toenails are normal.      Comments: Diabetic foot exam:   Left: Filament test present   Pulses Dorsalis Pedis:  present  Posterior Tibial:  present   Reflexes 2+    Vibratory sensation normal   Proprioception normal   Sharp/dull discrimination normal       Right: Filament test present   Pulses Dorsalis Pedis:  present  Posterior Tibial:  present   Reflexes 2+    Vibratory sensation normal   Proprioception normal   Sharp/dull discrimination normal        Skin:     General: Skin is warm and dry.      Capillary Refill: Capillary refill takes 2 to 3 seconds.   Neurological:      General: No focal deficit present.      Mental Status: She is alert and oriented to person, place, and time.   Psychiatric:         Mood and Affect: Mood normal.         Behavior: Behavior normal.         Thought Content: Thought content normal.         Procedures    Assessment/Plan:     Diagnoses and all orders for this visit:    1. Annual physical exam (Primary)  Immunizations:    -Tetanus: Received in 2021    -Influenza: Received today    -Prevnar: Once after age 65    -Shingrix: Received in 2021    -Covid: Will get next booster at pharmacy  CRC screening: Colonoscopy done 4 years ago with 10 year recall  Mammogram: 5/2023, BIRADs I  Pap: discontinued due to benign hysterectomy  DEXA: DEXA scan at 63    2. Flu vaccine need  -     Fluzone >6 Months (0721-2855)    3. Type 2 diabetes mellitus with hyperglycemia, without long-term current use of insulin  -     MicroAlbumin, Urine, Random - Urine, Clean Catch; Future      BMI is >= 30 and <35. (Class 1 Obesity). The following options were offered after discussion;: exercise counseling/recommendations and nutrition counseling/recommendations       An After Visit Summary was printed and given to the patient at discharge.  Return in about 6 months (around 4/13/2024) for Dr. Torres.       There are no Patient Instructions on file for this visit.      Discussion:     Return 6 months      I spent 28 minutes caring for Cheli on this date of service. This time includes time spent by me in the following activities: preparing for the visit, reviewing tests, obtaining and/or reviewing a separately obtained history, performing a medically appropriate examination and/or evaluation, counseling and educating the patient/family/caregiver, ordering medications, tests, or procedures, documenting information in the medical record, and independently interpreting results and communicating that information with the patient/family/caregiver     Emilee PRATT 10/13/2023   Electronically signed.  Answers submitted by the patient for this visit:  Primary Reason for Visit (Submitted on 10/12/2023)  What is the primary reason for your visit?: Physical

## 2023-10-18 LAB
ALBUMIN/CREAT UR: <22 MG/G CREAT (ref 0–29)
CREAT UR-MCNC: 13.8 MG/DL
MICROALBUMIN UR-MCNC: <3 UG/ML

## 2024-06-10 ENCOUNTER — OFFICE VISIT (OUTPATIENT)
Dept: INTERNAL MEDICINE | Facility: CLINIC | Age: 64
End: 2024-06-10
Payer: MEDICARE

## 2024-06-10 VITALS
SYSTOLIC BLOOD PRESSURE: 162 MMHG | HEIGHT: 66 IN | OXYGEN SATURATION: 97 % | WEIGHT: 196.3 LBS | RESPIRATION RATE: 16 BRPM | HEART RATE: 96 BPM | DIASTOLIC BLOOD PRESSURE: 94 MMHG | BODY MASS INDEX: 31.55 KG/M2

## 2024-06-10 DIAGNOSIS — Z13.820 ENCOUNTER FOR OSTEOPOROSIS SCREENING IN ASYMPTOMATIC POSTMENOPAUSAL PATIENT: ICD-10-CM

## 2024-06-10 DIAGNOSIS — I10 PRIMARY HYPERTENSION: ICD-10-CM

## 2024-06-10 DIAGNOSIS — I10 WHITE COAT SYNDROME WITH DIAGNOSIS OF HYPERTENSION: ICD-10-CM

## 2024-06-10 DIAGNOSIS — E11.9 TYPE 2 DIABETES MELLITUS WITHOUT COMPLICATION, WITHOUT LONG-TERM CURRENT USE OF INSULIN: Primary | ICD-10-CM

## 2024-06-10 DIAGNOSIS — Z78.0 ENCOUNTER FOR OSTEOPOROSIS SCREENING IN ASYMPTOMATIC POSTMENOPAUSAL PATIENT: ICD-10-CM

## 2024-06-10 DIAGNOSIS — Z23 NEED FOR VACCINATION: ICD-10-CM

## 2024-06-10 LAB — HBA1C MFR BLD: 6.4 % (ref 4.5–5.7)

## 2024-06-10 PROCEDURE — 3077F SYST BP >= 140 MM HG: CPT | Performed by: INTERNAL MEDICINE

## 2024-06-10 PROCEDURE — 99214 OFFICE O/P EST MOD 30 MIN: CPT | Performed by: INTERNAL MEDICINE

## 2024-06-10 PROCEDURE — 90677 PCV20 VACCINE IM: CPT | Performed by: INTERNAL MEDICINE

## 2024-06-10 PROCEDURE — 1160F RVW MEDS BY RX/DR IN RCRD: CPT | Performed by: INTERNAL MEDICINE

## 2024-06-10 PROCEDURE — 1170F FXNL STATUS ASSESSED: CPT | Performed by: INTERNAL MEDICINE

## 2024-06-10 PROCEDURE — G0009 ADMIN PNEUMOCOCCAL VACCINE: HCPCS | Performed by: INTERNAL MEDICINE

## 2024-06-10 PROCEDURE — 1159F MED LIST DOCD IN RCRD: CPT | Performed by: INTERNAL MEDICINE

## 2024-06-10 PROCEDURE — G0439 PPPS, SUBSEQ VISIT: HCPCS | Performed by: INTERNAL MEDICINE

## 2024-06-10 PROCEDURE — 3080F DIAST BP >= 90 MM HG: CPT | Performed by: INTERNAL MEDICINE

## 2024-06-10 PROCEDURE — 83036 HEMOGLOBIN GLYCOSYLATED A1C: CPT | Performed by: INTERNAL MEDICINE

## 2024-06-10 PROCEDURE — 3044F HG A1C LEVEL LT 7.0%: CPT | Performed by: INTERNAL MEDICINE

## 2024-06-10 NOTE — PROGRESS NOTES
The ABCs of the Annual Wellness Visit  Subsequent Medicare Wellness Visit    Subjective    Cheli Bah is a 63 y.o. female who presents for a Subsequent Medicare Wellness Visit.    The following portions of the patient's history were reviewed and   updated as appropriate: allergies, current medications, past family history, past medical history, past social history, past surgical history, and problem list.    Compared to one year ago, the patient feels her physical   health is the same.    Compared to one year ago, the patient feels her mental   health is the same.    Recent Hospitalizations:  She was not admitted to the hospital during the last year.       Current Medical Providers:  Patient Care Team:  Alex Torres DO as PCP - General (Internal Medicine)    Outpatient Medications Prior to Visit   Medication Sig Dispense Refill    aspirin 81 MG EC tablet Take 1 tablet by mouth.      atorvastatin (LIPITOR) 20 MG tablet Take 1 tablet by mouth 3 (Three) Times a Week. 36 tablet 3    Calcium Carb-Cholecalciferol (Calcium Carbonate-Vitamin D3) 600-400 MG-UNIT tablet Take  by mouth.      cetirizine (zyrTEC) 10 MG tablet Take 1 tablet by mouth Daily. 90 tablet 3    irbesartan (AVAPRO) 150 MG tablet Take 1 tablet by mouth Daily. 90 tablet 3    ketoconazole (NIZORAL) 2 % shampoo APPLY SHAMPOO TO SCALP 2 TO 3 TIMES A WEEK, LEAVE IN FOR 10 MINUTES THEN RINSE OUT      metFORMIN (GLUCOPHAGE) 500 MG tablet Take 1 tablet by mouth 2 (Two) Times a Day With Meals. 180 tablet 3     No facility-administered medications prior to visit.       No opioid medication identified on active medication list. I have reviewed chart for other potential  high risk medication/s and harmful drug interactions in the elderly.        Aspirin is on active medication list. Aspirin use is indicated based on review of current medical condition/s. Pros and cons of this therapy have been discussed today. Benefits of this medication outweigh potential  "harm.  Patient has been encouraged to continue taking this medication.  .      Patient Active Problem List   Diagnosis    Primary hypertension    Diverticulosis of large intestine without diverticulitis    Leukocytosis    Nontoxic multinodular goiter    Pure hypercholesterolemia    White coat syndrome with diagnosis of hypertension    Type 2 diabetes mellitus without complication, without long-term current use of insulin     Advance Care Planning   Advance Care Planning     Advance Directive is on file.  ACP discussion was held with the patient during this visit. Patient has an advance directive in EMR which is still valid.      Objective    Vitals:    06/10/24 0817   BP: 162/94   BP Location: Left arm   Patient Position: Sitting   Cuff Size: Adult   Pulse: 96   Resp: 16   SpO2: 97%   Weight: 89 kg (196 lb 4.8 oz)   Height: 168 cm (66.14\")     Estimated body mass index is 31.55 kg/m² as calculated from the following:    Height as of this encounter: 168 cm (66.14\").    Weight as of this encounter: 89 kg (196 lb 4.8 oz).           Does the patient have evidence of cognitive impairment? No    Lab Results   Component Value Date    HGBA1C 6.4 (A) 06/10/2024        HEALTH RISK ASSESSMENT    Smoking Status:  Social History     Tobacco Use   Smoking Status Former    Current packs/day: 0.00    Average packs/day: 0.5 packs/day for 10.3 years (5.2 ttl pk-yrs)    Types: Cigarettes    Start date: 1977    Quit date: 1987    Years since quittin.1   Smokeless Tobacco Never   Tobacco Comments    Sporadic use during the 10 years. Quit for good at beginning of my first pregnancy.     Alcohol Consumption:  Social History     Substance and Sexual Activity   Alcohol Use Yes    Comment: Beer or wine very rarely. Like less than 5 drinks/yr     Fall Risk Screen:    STEADI Fall Risk Assessment was completed, and patient is at LOW risk for falls.Assessment completed on:6/10/2024    Depression Screenin/10/2024     8:22 AM "   PHQ-2/PHQ-9 Depression Screening   Little Interest or Pleasure in Doing Things 0-->not at all   Feeling Down, Depressed or Hopeless 0-->not at all   PHQ-9: Brief Depression Severity Measure Score 0       Health Habits and Functional and Cognitive Screenin/10/2024     8:00 AM   Functional & Cognitive Status   Do you have difficulty preparing food and eating? No   Do you have difficulty bathing yourself, getting dressed or grooming yourself? No   Do you have difficulty using the toilet? No   Do you have difficulty moving around from place to place? No   Do you have trouble with steps or getting out of a bed or a chair? No   Current Diet Other   Dental Exam Up to date   Eye Exam Up to date   Exercise (times per week) 4 times per week   Current Exercises Include Other   Do you need help using the phone?  No   Are you deaf or do you have serious difficulty hearing?  No   Do you need help to go to places out of walking distance? No   Do you need help shopping? No   Do you need help preparing meals?  No   Do you need help with housework?  No   Do you need help with laundry? No   Do you need help taking your medications? No   Do you need help managing money? No   Do you ever drive or ride in a car without wearing a seat belt? No   Have you felt unusual stress, anger or loneliness in the last month? No   Who do you live with? Spouse   If you need help, do you have trouble finding someone available to you? No   Have you been bothered in the last four weeks by sexual problems? No   Do you have difficulty concentrating, remembering or making decisions? No       Age-appropriate Screening Schedule:  Refer to the list below for future screening recommendations based on patient's age, sex and/or medical conditions. Orders for these recommended tests are listed in the plan section. The patient has been provided with a written plan.    Health Maintenance   Topic Date Due    DXA SCAN  2021    ANNUAL WELLNESS VISIT   Never done    DIABETIC FOOT EXAM  Never done    DIABETIC EYE EXAM  02/03/2023    COVID-19 Vaccine (7 - 2023-24 season) 02/13/2024    INFLUENZA VACCINE  08/01/2024    LIPID PANEL  10/09/2024    BMI FOLLOWUP  10/13/2024    URINE MICROALBUMIN  10/16/2024    HEMOGLOBIN A1C  12/10/2024    MAMMOGRAM  09/12/2025    COLORECTAL CANCER SCREENING  09/07/2028    TDAP/TD VACCINES (3 - Td or Tdap) 12/31/2031    HEPATITIS C SCREENING  Completed    RSV Vaccine - Adults  Completed    Pneumococcal Vaccine 65+  Completed    ZOSTER VACCINE  Completed                  CMS Preventative Services Quick Reference  Risk Factors Identified During Encounter  Fall Risk-High or Moderate: Discussed Fall Prevention in the home  Immunizations Discussed/Encouraged: Prevnar 20 (Pneumococcal 20-valent conjugate) and RSV (Respiratory Syncytial Virus)  Inactivity/Sedentary: Patient was advised to exercise at least 150 minutes a week per CDC recommendations.  Dental Screening Recommended  Vision Screening Recommended  The above risks/problems have been discussed with the patient.  Pertinent information has been shared with the patient in the After Visit Summary.  An After Visit Summary and PPPS were made available to the patient.    Follow Up:   Next Medicare Wellness visit to be scheduled in 1 year.       Additional E&M Note during same encounter follows:  Patient has multiple medical problems which are significant and separately identifiable that require additional work above and beyond the Medicare Wellness Visit.      Chief Complaint  Hypertension and Diabetes    Subjective        HPI  History of Present Illness  The patient is a 63-year-old female who presents for evaluation of multiple medical concerns.    The patient's A1c levels have consistently remained below 195. However, she has recently experienced an increase in her A1c levels, which she attributes to recent life stressors. She recently joined a gym in 02/2024 and has recently visited her  "ophthalmologist, Dr. Hernandez, approximately a month ago.    The patient acknowledges the presence of white coat syndrome. She monitors her blood pressure daily, typically prior to her administration of irbesartan.    Patient or patient representative verbalized consent for the use of Ambient Listening during the visit with  Alex Torres DO for chart documentation. 6/10/2024  09:03 CDT     Review of Systems   Respiratory:  Negative for shortness of breath.    Cardiovascular:  Negative for chest pain.       Objective   Vital Signs:  /94 (BP Location: Left arm, Patient Position: Sitting, Cuff Size: Adult)   Pulse 96   Resp 16   Ht 168 cm (66.14\")   Wt 89 kg (196 lb 4.8 oz)   SpO2 97%   BMI 31.55 kg/m²     Physical Exam  Constitutional:       General: She is not in acute distress.  Cardiovascular:      Rate and Rhythm: Normal rate and regular rhythm.      Heart sounds: Normal heart sounds. No murmur heard.  Pulmonary:      Effort: Pulmonary effort is normal.      Breath sounds: Normal breath sounds. No wheezing.   Neurological:      Mental Status: She is alert and oriented to person, place, and time.      Gait: Gait normal.   Psychiatric:         Mood and Affect: Mood normal.         Behavior: Behavior normal.                         Assessment and Plan   Diagnoses and all orders for this visit:    1. Type 2 diabetes mellitus without complication, without long-term current use of insulin (Primary)  -     POCT glycated hemoglobin, total  -     Pneumococcal Conjugate Vaccine 20-Valent (PCV20)  -     Comprehensive Metabolic Panel; Future  -     Hemoglobin A1c; Future  -     Lipid Panel; Future  -     CBC (No Diff); Future  A1c remains well controlled at 6.4%. No changes needed to current regimen. Will plan labs prior to next visit.     2. Primary hypertension  3. White coat syndrome with diagnosis of hypertension  -     CBC (No Diff); Future    Fair control, BP goal for age is <140/90 per JNC 8 " guidelines, and continue home BP observation.     4. Need for vaccination  -     Pneumococcal Conjugate Vaccine 20-Valent (PCV20)    5. Encounter for osteoporosis screening in asymptomatic postmenopausal patient  -     DEXA Bone Density Axial; Future             Follow Up   Return in about 6 months (around 12/10/2024) for Recheck.  Patient was given instructions and counseling regarding her condition or for health maintenance advice. Please see specific information pulled into the AVS if appropriate.

## 2024-06-10 NOTE — PATIENT INSTRUCTIONS
You should call [phone] to schedule your bone density test.  .  Medicare Wellness  Personal Prevention Plan of Service     Date of Office Visit:    Encounter Provider:  Alex Torres DO  Place of Service:  [facility]  Patient Name: Cheli Bah  :  1960    As part of the Medicare Wellness portion of your visit today, we are providing you with this personalized preventive plan of services (PPPS). This plan is based upon recommendations of the [place] Preventive Services Task Force (USPSTF) and the Advisory Committee on Immunization Practices (ACIP).    This lists the preventive care services that should be considered, and provides dates of when you are due. Items listed as completed are up-to-date and do not require any further intervention.    Health Maintenance   Topic Date Due    DXA SCAN  2021    ANNUAL WELLNESS VISIT  Never done    DIABETIC FOOT EXAM  Never done    DIABETIC EYE EXAM  2023    Pneumococcal Vaccine 65+ (3 of 3 - PPSV23 or PCV20) 2023    COVID-19 Vaccine (2023- season) 2024    INFLUENZA VACCINE  2024    LIPID PANEL  10/09/2024    BMI FOLLOWUP  10/13/2024    URINE MICROALBUMIN  10/16/2024    HEMOGLOBIN A1C  12/10/2024    MAMMOGRAM  2025    COLORECTAL CANCER SCREENING  2028    TDAP/TD VACCINES (3 - Td or Tdap) 2031    HEPATITIS C SCREENING  Completed    RSV Vaccine - Adults  Completed    ZOSTER VACCINE  Completed       Orders Placed This Encounter   Procedures    DEXA Bone Density Axial     Standing Status:   Future     Standing Expiration Date:   6/10/2025     Order Specific Question:   Reason for Exam:     Answer:   screening     Order Specific Question:   Release to patient     Answer:   Routine Release [[id number]]     Order Specific Question:   Is patient taking or have taken long term Glucocorticoid (steroids)?     Answer:   No     Order Specific Question:   Does the patient have  rheumatoid arthritis?     Answer:   No     Order Specific Question:   Does the patient have secondary osteoporosis?     Answer:   No    Pneumococcal Conjugate Vaccine 20-Valent (PCV20)    POCT glycated hemoglobin, total     Order Specific Question:   Release to patient     Answer:   Routine Release [[id number]]       No follow-ups on file.

## 2024-06-19 ENCOUNTER — HOSPITAL ENCOUNTER (OUTPATIENT)
Dept: BONE DENSITY | Facility: HOSPITAL | Age: 64
Discharge: HOME | End: 2024-06-19
Admitting: INTERNAL MEDICINE
Payer: MEDICARE

## 2024-06-19 DIAGNOSIS — Z13.820 ENCOUNTER FOR OSTEOPOROSIS SCREENING IN ASYMPTOMATIC POSTMENOPAUSAL PATIENT: ICD-10-CM

## 2024-06-19 DIAGNOSIS — Z78.0 ENCOUNTER FOR OSTEOPOROSIS SCREENING IN ASYMPTOMATIC POSTMENOPAUSAL PATIENT: ICD-10-CM

## 2024-06-19 PROCEDURE — 77080 DXA BONE DENSITY AXIAL: CPT

## 2024-10-06 ENCOUNTER — APPOINTMENT (OUTPATIENT)
Dept: GENERAL RADIOLOGY | Facility: HOSPITAL | Age: 64
End: 2024-10-06
Payer: MEDICARE

## 2024-10-06 PROBLEM — U07.1 COVID-19: Status: ACTIVE | Noted: 2024-10-06

## 2024-10-06 PROCEDURE — 71046 X-RAY EXAM CHEST 2 VIEWS: CPT

## 2024-11-13 DIAGNOSIS — I10 ESSENTIAL HYPERTENSION: ICD-10-CM

## 2024-11-13 DIAGNOSIS — E78.00 PURE HYPERCHOLESTEROLEMIA: ICD-10-CM

## 2024-11-13 RX ORDER — IRBESARTAN 150 MG/1
150 TABLET ORAL DAILY
Qty: 30 TABLET | Refills: 0 | Status: SHIPPED | OUTPATIENT
Start: 2024-11-13

## 2024-11-13 RX ORDER — ATORVASTATIN CALCIUM 20 MG/1
20 TABLET, FILM COATED ORAL 3 TIMES WEEKLY
Qty: 12 TABLET | Refills: 0 | Status: SHIPPED | OUTPATIENT
Start: 2024-11-13

## 2024-12-06 ENCOUNTER — TELEPHONE (OUTPATIENT)
Dept: INTERNAL MEDICINE | Facility: CLINIC | Age: 64
End: 2024-12-06
Payer: MEDICARE

## 2024-12-06 NOTE — TELEPHONE ENCOUNTER
"  Caller: Cheli Bah \"Vanessa\"    Relationship to patient: Self    Best call back number: [phone]     Patient is needing: PATIENT NOTICED ON MYCHART THAT HER INSURANCE IS SWITCHED AND WANTS IT FIXED. THE MEDICARE SHOULD BE PRIMARY, [employer] IS SUPPLEMENTAL. PLEASE CORRECT. THANK YOU!        "

## 2024-12-11 LAB
ALBUMIN SERPL-MCNC: 4.7 G/DL (ref 3.9–4.9)
ALP SERPL-CCNC: 79 IU/L (ref 44–121)
ALT SERPL-CCNC: 21 IU/L (ref 0–32)
AST SERPL-CCNC: 22 IU/L (ref 0–40)
BILIRUB SERPL-MCNC: 0.3 MG/DL (ref 0–1.2)
BUN SERPL-MCNC: 14 MG/DL (ref 8–27)
BUN/CREAT SERPL: 17 (ref 12–28)
CALCIUM SERPL-MCNC: 9.6 MG/DL (ref 8.7–10.3)
CHLORIDE SERPL-SCNC: 104 MMOL/L (ref 96–106)
CHOLEST SERPL-MCNC: 168 MG/DL (ref 100–199)
CO2 SERPL-SCNC: 25 MMOL/L (ref 20–29)
CREAT SERPL-MCNC: 0.82 MG/DL (ref 0.57–1)
EGFRCR SERPLBLD CKD-EPI 2021: 79 ML/MIN/1.73
ERYTHROCYTE [DISTWIDTH] IN BLOOD BY AUTOMATED COUNT: 13.1 % (ref 11.7–15.4)
GLOBULIN SER CALC-MCNC: 2.4 G/DL (ref 1.5–4.5)
GLUCOSE SERPL-MCNC: 127 MG/DL (ref 70–99)
HBA1C MFR BLD: 6.8 % (ref 4.8–5.6)
HCT VFR BLD AUTO: 39.6 % (ref 34–46.6)
HDLC SERPL-MCNC: 49 MG/DL
HGB BLD-MCNC: 13 G/DL (ref 11.1–15.9)
LDLC SERPL CALC-MCNC: 85 MG/DL (ref 0–99)
MCH RBC QN AUTO: 29.4 PG (ref 26.6–33)
MCHC RBC AUTO-ENTMCNC: 32.8 G/DL (ref 31.5–35.7)
MCV RBC AUTO: 90 FL (ref 79–97)
PLATELET # BLD AUTO: 263 X10E3/UL (ref 150–450)
POTASSIUM SERPL-SCNC: 4.8 MMOL/L (ref 3.5–5.2)
PROT SERPL-MCNC: 7.1 G/DL (ref 6–8.5)
RBC # BLD AUTO: 4.42 X10E6/UL (ref 3.77–5.28)
SODIUM SERPL-SCNC: 141 MMOL/L (ref 134–144)
TRIGL SERPL-MCNC: 202 MG/DL (ref 0–149)
VLDLC SERPL CALC-MCNC: 34 MG/DL (ref 5–40)
WBC # BLD AUTO: 9 X10E3/UL (ref 3.4–10.8)

## 2024-12-13 ENCOUNTER — OFFICE VISIT (OUTPATIENT)
Dept: INTERNAL MEDICINE | Facility: CLINIC | Age: 64
End: 2024-12-13
Payer: MEDICARE

## 2024-12-13 VITALS
BODY MASS INDEX: 34.49 KG/M2 | OXYGEN SATURATION: 99 % | RESPIRATION RATE: 19 BRPM | HEIGHT: 64 IN | HEART RATE: 109 BPM | DIASTOLIC BLOOD PRESSURE: 90 MMHG | SYSTOLIC BLOOD PRESSURE: 151 MMHG | WEIGHT: 202 LBS

## 2024-12-13 DIAGNOSIS — I10 WHITE COAT SYNDROME WITH DIAGNOSIS OF HYPERTENSION: ICD-10-CM

## 2024-12-13 DIAGNOSIS — I10 PRIMARY HYPERTENSION: ICD-10-CM

## 2024-12-13 DIAGNOSIS — E78.00 PURE HYPERCHOLESTEROLEMIA: ICD-10-CM

## 2024-12-13 DIAGNOSIS — E04.2 NONTOXIC MULTINODULAR GOITER: ICD-10-CM

## 2024-12-13 DIAGNOSIS — E11.9 TYPE 2 DIABETES MELLITUS WITHOUT COMPLICATION, WITHOUT LONG-TERM CURRENT USE OF INSULIN: Primary | ICD-10-CM

## 2024-12-13 PROBLEM — U07.1 COVID-19: Status: RESOLVED | Noted: 2024-10-06 | Resolved: 2024-12-13

## 2024-12-13 PROCEDURE — 3044F HG A1C LEVEL LT 7.0%: CPT | Performed by: INTERNAL MEDICINE

## 2024-12-13 PROCEDURE — 3080F DIAST BP >= 90 MM HG: CPT | Performed by: INTERNAL MEDICINE

## 2024-12-13 PROCEDURE — 1159F MED LIST DOCD IN RCRD: CPT | Performed by: INTERNAL MEDICINE

## 2024-12-13 PROCEDURE — G0438 PPPS, INITIAL VISIT: HCPCS | Performed by: INTERNAL MEDICINE

## 2024-12-13 PROCEDURE — 1160F RVW MEDS BY RX/DR IN RCRD: CPT | Performed by: INTERNAL MEDICINE

## 2024-12-13 PROCEDURE — 1126F AMNT PAIN NOTED NONE PRSNT: CPT | Performed by: INTERNAL MEDICINE

## 2024-12-13 PROCEDURE — 3077F SYST BP >= 140 MM HG: CPT | Performed by: INTERNAL MEDICINE

## 2024-12-13 PROCEDURE — 1170F FXNL STATUS ASSESSED: CPT | Performed by: INTERNAL MEDICINE

## 2024-12-13 PROCEDURE — 99213 OFFICE O/P EST LOW 20 MIN: CPT | Performed by: INTERNAL MEDICINE

## 2024-12-13 RX ORDER — ATORVASTATIN CALCIUM 20 MG/1
20 TABLET, FILM COATED ORAL 3 TIMES WEEKLY
Qty: 36 TABLET | Refills: 3 | Status: SHIPPED | OUTPATIENT
Start: 2024-12-13

## 2024-12-13 RX ORDER — IRBESARTAN 150 MG/1
150 TABLET ORAL DAILY
Qty: 90 TABLET | Refills: 3 | Status: SHIPPED | OUTPATIENT
Start: 2024-12-13

## 2024-12-13 NOTE — PROGRESS NOTES
Subjective   The ABCs of the Annual Wellness Visit  Medicare Wellness Visit      Cheli Bah is a 64 y.o. patient who presents for a Medicare Wellness Visit.    The following portions of the patient's history were reviewed and   updated as appropriate: allergies, current medications, past family history, past medical history, past social history, past surgical history, and problem list.    Compared to one year ago, the patient's physical   health is the same.  Compared to one year ago, the patient's mental   health is the same.    Recent Hospitalizations:  She was not admitted to the hospital during the last year.     Current Medical Providers:  Patient Care Team:  Alex Torres DO as PCP - General (Internal Medicine)    Outpatient Medications Prior to Visit   Medication Sig Dispense Refill    Calcium Carb-Cholecalciferol (Calcium Carbonate-Vitamin D3) 600-400 MG-UNIT tablet Take  by mouth.      ketoconazole (NIZORAL) 2 % shampoo APPLY SHAMPOO TO SCALP 2 TO 3 TIMES A WEEK, LEAVE IN FOR 10 MINUTES THEN RINSE OUT      aspirin 81 MG EC tablet Take 1 tablet by mouth.      atorvastatin (LIPITOR) 20 MG tablet Take 1 tablet by mouth 3 (Three) Times a Week. 12 tablet 0    irbesartan (AVAPRO) 150 MG tablet Take 1 tablet by mouth Daily. 30 tablet 0    metFORMIN (GLUCOPHAGE) 500 MG tablet Take 1 tablet by mouth 2 (Two) Times a Day With Meals. 180 tablet 3    cetirizine (zyrTEC) 10 MG tablet Take 1 tablet by mouth Daily. (Patient not taking: Reported on 12/13/2024) 90 tablet 3    acetaminophen (TYLENOL) 80 MG chewable tablet Chew 1 tablet Every 4 (Four) Hours As Needed for Mild Pain.      guaifenesin (ROBITUSSIN) 100 MG/5ML liquid Take 10 mL by mouth 3 (Three) Times a Day As Needed for Cough.       No facility-administered medications prior to visit.     No opioid medication identified on active medication list. I have reviewed chart for other potential  high risk medication/s and harmful drug interactions in the  "elderly.      Aspirin is on active medication list.  Recommend to stop as there is no ASCVD indication.    Patient Active Problem List   Diagnosis    Primary hypertension    Diverticulosis of large intestine without diverticulitis    Nontoxic multinodular goiter    Pure hypercholesterolemia    White coat syndrome with diagnosis of hypertension    Type 2 diabetes mellitus without complication, without long-term current use of insulin     Advance Care Planning Advance Directive is on file.  ACP discussion was held with the patient during this visit. Patient has an advance directive in EMR which is still valid.             Objective   Vitals:    24 1403   BP: 151/90   BP Location: Left arm   Patient Position: Sitting   Cuff Size: Adult   Pulse: 109   Resp: 19   SpO2: 99%   Weight: 91.6 kg (202 lb)   Height: 162.6 cm (64.02\")   PainSc: 0-No pain       Estimated body mass index is 34.66 kg/m² as calculated from the following:    Height as of this encounter: 162.6 cm (64.02\").    Weight as of this encounter: 91.6 kg (202 lb).    BMI is >= 30 and <35. (Class 1 Obesity). The following options were offered after discussion;: exercise counseling/recommendations and nutrition counseling/recommendations       Does the patient have evidence of cognitive impairment? No  Lab Results   Component Value Date    CHLPL 168 12/10/2024    TRIG 202 (H) 12/10/2024    HDL 49 12/10/2024    LDL 85 12/10/2024    VLDL 34 12/10/2024    HGBA1C 6.8 (H) 12/10/2024                                                                                                Health  Risk Assessment    Smoking Status:  Social History     Tobacco Use   Smoking Status Former    Current packs/day: 0.00    Average packs/day: 0.5 packs/day for 10.3 years (5.2 ttl pk-yrs)    Types: Cigarettes    Start date: 1977    Quit date: 1987    Years since quittin.6   Smokeless Tobacco Never   Tobacco Comments    Sporadic use during the 10 years. Quit for good at " beginning of my first pregnancy.     Alcohol Consumption:  Social History     Substance and Sexual Activity   Alcohol Use Yes    Comment: Beer or wine very rarely. Like less than 5 drinks/yr       Fall Risk Screen  STEADI Fall Risk Assessment was completed, and patient is at LOW risk for falls.Assessment completed on:2024    Depression Screening   Little interest or pleasure in doing things? Not at all   Feeling down, depressed, or hopeless? Not at all   PHQ-2 Total Score 0      Health Habits and Functional and Cognitive Screenin/13/2024     2:08 PM   Functional & Cognitive Status   Do you have difficulty preparing food and eating? No   Do you have difficulty bathing yourself, getting dressed or grooming yourself? No   Do you have difficulty using the toilet? No   Do you have difficulty moving around from place to place? No   Do you have trouble with steps or getting out of a bed or a chair? No   Current Diet Well Balanced Diet   Dental Exam Up to date   Eye Exam Up to date   Exercise (times per week) 3 times per week   Current Exercises Include Home Fitness Gym;Light Weights;Stationary Bicycling/Spin Class;House Cleaning   Do you need help using the phone?  No   Are you deaf or do you have serious difficulty hearing?  No   Do you need help to go to places out of walking distance? No   Do you need help shopping? No   Do you need help preparing meals?  No   Do you need help with housework?  No   Do you need help with laundry? No   Do you need help taking your medications? No   Do you need help managing money? No   Do you ever drive or ride in a car without wearing a seat belt? No   Have you felt unusual stress, anger or loneliness in the last month? No   Who do you live with? Spouse   If you need help, do you have trouble finding someone available to you? No   Have you been bothered in the last four weeks by sexual problems? No   Do you have difficulty concentrating, remembering or making decisions?  No           Age-appropriate Screening Schedule:  Refer to the list below for future screening recommendations based on patient's age, sex and/or medical conditions. Orders for these recommended tests are listed in the plan section. The patient has been provided with a written plan.    Health Maintenance List  Health Maintenance   Topic Date Due    DIABETIC FOOT EXAM  Never done    BMI FOLLOWUP  10/13/2024    DIABETIC EYE EXAM  04/15/2025    HEMOGLOBIN A1C  06/10/2025    MAMMOGRAM  09/12/2025    LIPID PANEL  12/10/2025    ANNUAL WELLNESS VISIT  12/13/2025    DXA SCAN  06/19/2026    COLORECTAL CANCER SCREENING  09/07/2028    TDAP/TD VACCINES (3 - Td or Tdap) 12/31/2031    HEPATITIS C SCREENING  Completed    COVID-19 Vaccine  Completed    INFLUENZA VACCINE  Completed    Pneumococcal Vaccine 65+  Completed    ZOSTER VACCINE  Completed    URINE MICROALBUMIN  Discontinued                                                                                                                                                CMS Preventative Services Quick Reference  Risk Factors Identified During Encounter  Fall Risk-High or Moderate: Discussed Fall Prevention in the home  Immunizations Discussed/Encouraged:  None due.  Dental Screening Recommended  Vision Screening Recommended    The above risks/problems have been discussed with the patient.  Pertinent information has been shared with the patient in the After Visit Summary.  An After Visit Summary and PPPS were made available to the patient.    Follow Up:   Next Medicare Wellness visit to be scheduled in 1 year.         Additional E&M Note during same encounter follows:  Patient has additional, significant, and separately identifiable condition(s)/problem(s) that require work above and beyond the Medicare Wellness Visit     Chief Complaint  No chief complaint on file.    Subjective    HPI  Vanessa is also being seen today for additional medical problem/s.       The patient presents  "for a Medicare wellness visit.    She has been experiencing significant stress over the past 6 to 8 months, culminating in the death of her mother at the end of September 2024. She identifies as a stress eater and reports a weight loss of approximately 6 pounds, with her current weight being 202 pounds. She is currently on a regimen of metformin immediate release, administered twice daily at a dosage of 500 mg. She inquires about the possibility of increasing the dosage or frequency of this medication. She reports that her physical health remains stable compared to the previous year, and she notes an improvement in her mental health. She has not required hospitalization and continues to take aspirin daily. She has no history of myocardial infarction or stroke. She reports no memory issues and has not experienced any falls in the past year, except for one incident six months ago when she fell while stepping off a pier into a fishing boat. She requests refills of all her medications.    She reports a persistent, mild cough that she attributes to a COVID-19 infection contracted in October 2024. The cough is non-productive and is described as a tickling sensation in her throat. She avoids using nasal sprays due to a history of nosebleeds caused by a cluster of blood vessels near the surface. She uses a cool mist vaporizer at night and maintains a humidity level of around 40 percent.    MEDICATIONS  Current: Metformin, aspirin, irbesartan, calcium supplement.    IMMUNIZATIONS  She received the influenza vaccine and COVID-19 vaccine on 11/05/2023.          Objective   Vital Signs:  /90 (BP Location: Left arm, Patient Position: Sitting, Cuff Size: Adult)   Pulse 109   Resp 19   Ht 162.6 cm (64.02\")   Wt 91.6 kg (202 lb)   SpO2 99%   BMI 34.66 kg/m²   Physical Exam  Constitutional:       General: She is not in acute distress.  Pulmonary:      Effort: Pulmonary effort is normal. No respiratory distress. "   Neurological:      Mental Status: She is alert and oriented to person, place, and time.      Gait: Gait normal.                    Comprehensive Metabolic Panel (12/10/2024 08:00)    Hemoglobin A1c (12/10/2024 08:00)    CBC (No Diff) (12/10/2024 08:00)    Lipid Panel (12/10/2024 08:00)    Results  Laboratory Studies  A1c is at 6.8. Cholesterol levels are good. Triglycerides are slightly elevated. Blood counts are normal. Blood sugar is slightly high. Electrolytes, kidney, and liver function tests are normal.              Assessment and Plan   Diagnoses and all orders for this visit:    1. Type 2 diabetes mellitus without complication, without long-term current use of insulin (Primary)    2. Primary hypertension    3. White coat syndrome with diagnosis of hypertension    4. Nontoxic multinodular goiter  -     US Thyroid; Future    5. Pure hypercholesterolemia  -     atorvastatin (LIPITOR) 20 MG tablet; Take 1 tablet by mouth 3 (Three) Times a Week.  Dispense: 36 tablet; Refill: 3    6. Essential hypertension  -     irbesartan (AVAPRO) 150 MG tablet; Take 1 tablet by mouth Daily.  Dispense: 90 tablet; Refill: 3  Fair control, BP goal for age is <140/90 per JNC 8 guidelines, continue current medications, and home readings added in image above with excellent control.            1. Diabetes mellitus.  Her A1c level is currently at 6.8, which is the highest it has been over the past few years. The goal is to maintain her A1c between 7 and 7.5. She is currently taking metformin 500 mg twice a day. The addition of Ozempic was discussed as a potential treatment option to help with both blood sugar control and weight management. She was advised to research Ozempic and consider it as a future treatment option. If her blood sugar levels improve, a reduction in the metformin dosage may be considered. A finger prick test will be conducted during her next visit to monitor her A1c levels.    2. Post-viral cough.  She reports a  residual cough following a COVID-19 infection in October 2024. The cough is described as a tickle in her throat without any color. She was advised to use a cool mist vaporizer, which she already uses, to help with the dryness. If the cough persists for another month, she should notify the clinic.    3. Thyroid nodules.  Given the presence of thyroid nodules observed a few years ago, an ultrasound is recommended to ensure there have been no changes. An ultrasound of the thyroid will be scheduled.    4. Medication management.  She requested refills for her medications. Prescriptions for irbesartan and metformin will be renewed and sent to [facility] pharmacy.    5. Health maintenance.  She is up to date on her mammograms, bone densities, and colonoscopies. She received her flu shot and COVID-19 vaccine on 11/05/2024.    Follow-up  The patient will follow up in 6 months.            Follow Up   Return in about 6 months (around 6/13/2025) for Medicare Wellness.  Patient was given instructions and counseling regarding her condition or for health maintenance advice. Please see specific information pulled into the AVS if appropriate.  Patient or patient representative verbalized consent for the use of Ambient Listening during the visit with  Alex Torres DO for chart documentation. 12/13/2024  17:08 CST

## 2024-12-13 NOTE — PROGRESS NOTES
"CC:    History:  Cheli Bah is a 64 y.o. female   History of Present Illness        {Problem List  Visit Diagnosis   Encounters  Notes  Medications  Labs  Result Review Imaging  Media :23}  ROS:  Review of Systems     reports that she quit smoking about 37 years ago. Her smoking use included cigarettes. She started smoking about 47 years ago. She has a 5.2 pack-year smoking history. She has never used smokeless tobacco. She reports current alcohol use. She reports that she does not use drugs.      Current Outpatient Medications:     atorvastatin (LIPITOR) 20 MG tablet, Take 1 tablet by mouth 3 (Three) Times a Week., Disp: 12 tablet, Rfl: 0    Calcium Carb-Cholecalciferol (Calcium Carbonate-Vitamin D3) 600-400 MG-UNIT tablet, Take  by mouth., Disp: , Rfl:     irbesartan (AVAPRO) 150 MG tablet, Take 1 tablet by mouth Daily., Disp: 30 tablet, Rfl: 0    ketoconazole (NIZORAL) 2 % shampoo, APPLY SHAMPOO TO SCALP 2 TO 3 TIMES A WEEK, LEAVE IN FOR 10 MINUTES THEN RINSE OUT, Disp: , Rfl:     metFORMIN (GLUCOPHAGE) 500 MG tablet, Take 1 tablet by mouth 2 (Two) Times a Day With Meals., Disp: 180 tablet, Rfl: 3    cetirizine (zyrTEC) 10 MG tablet, Take 1 tablet by mouth Daily. (Patient not taking: Reported on 12/13/2024), Disp: 90 tablet, Rfl: 3    OBJECTIVE:  /90 (BP Location: Left arm, Patient Position: Sitting, Cuff Size: Adult)   Pulse 109   Resp 19   Ht 162.6 cm (64.02\")   Wt 91.6 kg (202 lb)   SpO2 99%   BMI 34.66 kg/m²    Physical Exam  {Labs  Result Review  Imaging  Med Tab  Media  Procedures :23}    Assessment/Plan  {CC Problem List  Visit Diagnosis   ROS  Review (Popup)  Health Maintenance  Quality  BestPractice  Medications  SmartSets  SnapShot Encounters  Media :23}   Diagnoses and all orders for this visit:    1. Type 2 diabetes mellitus without complication, without long-term current use of insulin (Primary)    2. Primary hypertension    3. White coat syndrome with diagnosis " of hypertension    4. Nontoxic multinodular goiter  -     US Thyroid; Future    5. Pure hypercholesterolemia    6. Essential hypertension    7. Impaired glucose tolerance          An After Visit Summary was printed and given to the patient at discharge.  Return in about 6 months (around 6/13/2025) for Medicare Wellness.  {Instructions Charge Capture  Follow-up Communications :23}    {NONA [employer] Provider Statement:47826}    Alex Torres D.O. 12/13/2024   Electronically signed.

## 2024-12-13 NOTE — PATIENT INSTRUCTIONS
Medicare Wellness  Personal Prevention Plan of Service     Date of Office Visit:    Encounter Provider:  Alex Torres DO  Place of Service:  [facility] INTERNAL MEDICINE  Patient Name: Cheli Bah  :  1960    As part of the Medicare Wellness portion of your visit today, we are providing you with this personalized preventive plan of services (PPPS). This plan is based upon recommendations of the [place] Preventive Services Task Force (USPSTF) and the Advisory Committee on Immunization Practices (ACIP).    This lists the preventive care services that should be considered, and provides dates of when you are due. Items listed as completed are up-to-date and do not require any further intervention.    Health Maintenance   Topic Date Due    DIABETIC FOOT EXAM  Never done    BMI FOLLOWUP  10/13/2024    DIABETIC EYE EXAM  04/15/2025    HEMOGLOBIN A1C  06/10/2025    MAMMOGRAM  2025    LIPID PANEL  12/10/2025    ANNUAL WELLNESS VISIT  2025    DXA SCAN  2026    COLORECTAL CANCER SCREENING  2028    TDAP/TD VACCINES (3 - Td or Tdap) 2031    HEPATITIS C SCREENING  Completed    COVID-19 Vaccine  Completed    INFLUENZA VACCINE  Completed    Pneumococcal Vaccine 65+  Completed    ZOSTER VACCINE  Completed    URINE MICROALBUMIN  Discontinued       Orders Placed This Encounter   Procedures    US Thyroid     Standing Status:   Future     Standing Expiration Date:   2025     Order Specific Question:   Reason for Exam:     Answer:   multiple nodules     Order Specific Question:   Release to patient     Answer:   Routine Release [[id number]]       Return in about 6 months (around 2025) for Recheck.

## 2024-12-30 ENCOUNTER — HOSPITAL ENCOUNTER (OUTPATIENT)
Dept: ULTRASOUND IMAGING | Facility: HOSPITAL | Age: 64
Discharge: HOME | End: 2024-12-30
Admitting: INTERNAL MEDICINE
Payer: MEDICARE

## 2024-12-30 DIAGNOSIS — E04.2 NONTOXIC MULTINODULAR GOITER: ICD-10-CM

## 2024-12-30 PROCEDURE — 76536 US EXAM OF HEAD AND NECK: CPT

## 2025-06-16 ENCOUNTER — OFFICE VISIT (OUTPATIENT)
Dept: INTERNAL MEDICINE | Facility: CLINIC | Age: 65
End: 2025-06-16
Payer: MEDICARE

## 2025-06-16 VITALS
HEART RATE: 112 BPM | WEIGHT: 195.4 LBS | SYSTOLIC BLOOD PRESSURE: 136 MMHG | DIASTOLIC BLOOD PRESSURE: 85 MMHG | OXYGEN SATURATION: 98 % | HEIGHT: 64 IN | BODY MASS INDEX: 33.36 KG/M2 | RESPIRATION RATE: 16 BRPM

## 2025-06-16 DIAGNOSIS — Z12.31 ENCOUNTER FOR SCREENING MAMMOGRAM FOR MALIGNANT NEOPLASM OF BREAST: ICD-10-CM

## 2025-06-16 DIAGNOSIS — I10 WHITE COAT SYNDROME WITH DIAGNOSIS OF HYPERTENSION: ICD-10-CM

## 2025-06-16 DIAGNOSIS — I10 PRIMARY HYPERTENSION: ICD-10-CM

## 2025-06-16 DIAGNOSIS — E78.00 PURE HYPERCHOLESTEROLEMIA: ICD-10-CM

## 2025-06-16 DIAGNOSIS — E11.9 TYPE 2 DIABETES MELLITUS WITHOUT COMPLICATION, WITHOUT LONG-TERM CURRENT USE OF INSULIN: Primary | ICD-10-CM

## 2025-06-16 LAB — HBA1C MFR BLD: 6.3 % (ref 4.5–5.7)

## 2025-06-16 PROCEDURE — 1160F RVW MEDS BY RX/DR IN RCRD: CPT | Performed by: INTERNAL MEDICINE

## 2025-06-16 PROCEDURE — 99214 OFFICE O/P EST MOD 30 MIN: CPT | Performed by: INTERNAL MEDICINE

## 2025-06-16 PROCEDURE — 3079F DIAST BP 80-89 MM HG: CPT | Performed by: INTERNAL MEDICINE

## 2025-06-16 PROCEDURE — 3075F SYST BP GE 130 - 139MM HG: CPT | Performed by: INTERNAL MEDICINE

## 2025-06-16 PROCEDURE — 83036 HEMOGLOBIN GLYCOSYLATED A1C: CPT | Performed by: INTERNAL MEDICINE

## 2025-06-16 PROCEDURE — 3044F HG A1C LEVEL LT 7.0%: CPT | Performed by: INTERNAL MEDICINE

## 2025-06-16 PROCEDURE — 1159F MED LIST DOCD IN RCRD: CPT | Performed by: INTERNAL MEDICINE

## 2025-06-16 PROCEDURE — 1126F AMNT PAIN NOTED NONE PRSNT: CPT | Performed by: INTERNAL MEDICINE

## 2025-06-16 NOTE — PROGRESS NOTES
CC: giovanny diabetes    History:  Cheli Bah is a 64 y.o. female   History of Present Illness  The patient came in for a routine checkup.    She reports no current health issues. She has lost 7 pounds since December 2024 and is happy with the progress. She has no chest pain, palpitations, or irregular heartbeats. She mentioned having mild drainage from allergies but nothing out of the ordinary. Her bowel and bladder functions are normal. She hasn't experienced any dizziness, lightheadedness, or swelling in her feet or ankles. She sometimes feels joint discomfort when the weather changes or if she eats too much salt. She is on medication for high blood pressure. She is due for a colonoscopy at age 68. She had a fall in March 2025 after tripping over a backpack strap but didn't sustain any serious injuries.        ROS:  Review of Systems   Respiratory:  Negative for shortness of breath.    Cardiovascular:  Negative for chest pain.   Endocrine: Negative for polydipsia and polyuria.   Neurological:  Negative for dizziness, tremors, speech difficulty and headaches.   Psychiatric/Behavioral:  Negative for confusion.         reports that she quit smoking about 38 years ago. Her smoking use included cigarettes. She started smoking about 48 years ago. She has a 5.2 pack-year smoking history. She has been exposed to tobacco smoke. She has never used smokeless tobacco. She reports current alcohol use. She reports that she does not use drugs.      Current Outpatient Medications:     atorvastatin (LIPITOR) 20 MG tablet, Take 1 tablet by mouth 3 (Three) Times a Week., Disp: 36 tablet, Rfl: 3    Calcium Carb-Cholecalciferol (Calcium Carbonate-Vitamin D3) 600-400 MG-UNIT tablet, Take  by mouth., Disp: , Rfl:     cetirizine (zyrTEC) 10 MG tablet, Take 1 tablet by mouth Daily., Disp: 90 tablet, Rfl: 3    irbesartan (AVAPRO) 150 MG tablet, Take 1 tablet by mouth Daily., Disp: 90 tablet, Rfl: 3    ketoconazole (NIZORAL) 2 % shampoo, APPLY  "SHAMPOO TO SCALP 2 TO 3 TIMES A WEEK, LEAVE IN FOR 10 MINUTES THEN RINSE OUT, Disp: , Rfl:     metFORMIN (GLUCOPHAGE) 500 MG tablet, Take 1 tablet by mouth 2 (Two) Times a Day With Meals., Disp: 180 tablet, Rfl: 3    OBJECTIVE:  /85 (BP Location: Left arm, Patient Position: Sitting, Cuff Size: Adult)   Pulse 112   Resp 16   Ht 162.6 cm (64.02\")   Wt 88.6 kg (195 lb 6.4 oz)   SpO2 98%   BMI 33.52 kg/m²    Physical Exam  Constitutional:       General: She is not in acute distress.  Cardiovascular:      Rate and Rhythm: Normal rate and regular rhythm.      Heart sounds: Normal heart sounds. No murmur heard.  Pulmonary:      Effort: Pulmonary effort is normal.      Breath sounds: Normal breath sounds. No wheezing.   Neurological:      Mental Status: She is alert and oriented to person, place, and time.      Gait: Gait normal.   Psychiatric:         Mood and Affect: Mood normal.         Behavior: Behavior normal.           Assessment/Plan     Diagnoses and all orders for this visit:    1. Type 2 diabetes mellitus without complication, without long-term current use of insulin (Primary)  -     POCT glycated hemoglobin, total  -     Comprehensive Metabolic Panel; Future  -     Hemoglobin A1c; Future  -     Lipid Panel; Future  -     CBC (No Diff); Future  -     Microalbumin / Creatinine Urine Ratio - Urine, Clean Catch; Future  A1c well controlled at 6.3%. Plan for labs prior to next visit.     2. Pure hypercholesterolemia  Stable on moderate intensity statin therapy per ACC/AHA guidelines.    3. Primary hypertension  4. White coat syndrome with diagnosis of hypertension  -     CBC (No Diff); Future  Well controlled, BP goal for age is <140/90 per JNC 8 guidelines, and continue current medications    5. Encounter for screening mammogram for malignant neoplasm of breast  -     Mammo Screening Digital Tomosynthesis Bilateral With CAD; Future    An After Visit Summary was printed and given to the patient at " discharge.  Return in about 6 months (around 12/16/2025) for Medicare Wellness.      Patient or patient representative verbalized consent for the use of Ambient Listening during the visit with  Alex Torres DO for chart documentation. 6/16/2025  13:39 CDT    Alex Torres D.O. 6/16/2025   Electronically signed.

## 2025-06-24 NOTE — PROGRESS NOTES
[facility] - PODIATRY    Today's Date: 06/30/2025     Patient Name: Cheli Bah  MRN: [id number]  CSN: [id number]  PCP: Alex Torres DO  Referring Provider: No ref. provider found    SUBJECTIVE     Chief Complaint   Patient presents with    Follow-up     Alex Torres DO   -06/16/2025-LEFT BIG TOE CALLUS   Pt here for follow-up. Pt states that she has a big callus on her right great toe.    Diabetes     HPI: Cheli Bah, a 64 y.o.female, comes to clinic as a(n) established patient complaining of toenail/callus issues. Patient has h/o Hypertension, hypercholesterolemia, thyroid nodules, diverticulitis, cataract, impaired fasting glucose.  Patient presents with thickened callused area to the left hallux.  She reports that she has been caring for this for quite some time at home.  She is using a pumice stone and ammonium lactate lotion.  Relates that she has had a change of gait related to walking with callus left hallux.  Patient is NIDDM and unsure of last BG level.  Reports she was recently diagnosed as diabetic after being prediabetic for years.  Denies any numbness or tingling.  Denies any open wounds or sores.  Denies pain. Denies previous treatment. Denies any constitutional symptoms. No other pedal complaints at this time.    Past Medical History:   Diagnosis Date    Allergic     Seasonal/25+ yrs    Callus     It’s been a long time    Cataract Jan 2022    Removed Feb 2022    Diverticulosis June 2011    Fallen arches     It’s been a long time    Female infertility early 1980s    7+ years of trying before 1st pregnancy. It was a “combination” problem. I was only ovulating 4 or 5 times per year, and my  had a low sperm count.    Gestational diabetes Summer 1987    History of medical problems 2002    Thyroid  nodules    Hypercholesteremia     Hypertension     Controlled    Varicella Childhood     Past Surgical History:   Procedure Laterality Date    CATARACT EXTRACTION   2022     SECTION      COLONOSCOPY  2018    2nd one (@ 48 and 57 y/o)    EYE SURGERY  2022    Cataracts removed    HYSTERECTOMY      KNEE ARTHROSCOPY Bilateral     2015    LYMPH NODE BIOPSY  Aug 2012    VAGINAL HYSTERECTOMY  2003    WISDOM TOOTH EXTRACTION  1970s     Family History   Problem Relation Age of Onset    Pneumonia Father         passed away with Bacteria Pneumonia    Early death Father         Pneumonia ( at 34 y/o)    Hypertension Mother     Rheum arthritis Mother     Arthritis Mother         RA    Hearing loss Mother     Hyperlipidemia Mother     Depression Mother         Officially undiagnosed until she went into a nursing home    Diabetes Mother         Type 2    Other Mother         Dementia (moderate at the time of her death--2024)    Hyperlipidemia Brother     Hypertension Brother     Stroke Sister     Mental illness Sister         Bipolar    Stroke Sister          2022    Diabetes Sister         Type 2    Seizures Daughter         unknown    Other Daughter         Seizure disorder (unknown)    Stroke Paternal Grandfather         Stroke 1966    Stomach cancer Paternal Grandmother         passed at 86    Cancer Paternal Grandmother         Stomach ( at 87 y/o)    No Known Problems Maternal Grandmother     No Known Problems Maternal Grandfather     Diabetes Sister         Type 2    Breast cancer Neg Hx     Ovarian cancer Neg Hx     Uterine cancer Neg Hx     Colon cancer Neg Hx     Melanoma Neg Hx      Social History     Socioeconomic History    Marital status:    Tobacco Use    Smoking status: Former     Current packs/day: 0.00     Average packs/day: 0.5 packs/day for 10.3 years (5.2 ttl pk-yrs)     Types: Cigarettes     Start date: 1977     Quit date: 1987     Years since quittin.1     Passive exposure: Past    Smokeless tobacco: Never    Tobacco comments:     Sporadic use during the 10 years. Quit for good at beginning of  my first pregnancy.   Vaping Use    Vaping status: Never Used   Substance and Sexual Activity    Alcohol use: Yes     Comment: Beer or wine very rarely. Like less than 5 drinks/yr    Drug use: Never    Sexual activity: Yes     Partners: Male     Birth control/protection: Vasectomy, Hysterectomy     Allergies   Allergen Reactions    Amoxicillin-Pot Clavulanate Diarrhea    Drug Ingredient [Levofloxacin] GI Intolerance    Metoclopramide Anxiety     Current Outpatient Medications   Medication Sig Dispense Refill    atorvastatin (LIPITOR) 20 MG tablet Take 1 tablet by mouth 3 (Three) Times a Week. 36 tablet 3    Calcium Carb-Cholecalciferol (Calcium Carbonate-Vitamin D3) 600-400 MG-UNIT tablet Take  by mouth.      cetirizine (zyrTEC) 10 MG tablet Take 1 tablet by mouth Daily. 90 tablet 3    irbesartan (AVAPRO) 150 MG tablet Take 1 tablet by mouth Daily. 90 tablet 3    ketoconazole (NIZORAL) 2 % shampoo APPLY SHAMPOO TO SCALP 2 TO 3 TIMES A WEEK, LEAVE IN FOR 10 MINUTES THEN RINSE OUT      metFORMIN (GLUCOPHAGE) 500 MG tablet Take 1 tablet by mouth 2 (Two) Times a Day With Meals. 180 tablet 3     No current facility-administered medications for this visit.     Review of Systems   Constitutional:  Negative for chills and fever.   HENT:  Negative for congestion.    Respiratory:  Negative for shortness of breath.    Cardiovascular:  Negative for chest pain and leg swelling.   Gastrointestinal:  Negative for constipation, diarrhea, nausea and vomiting.   Musculoskeletal:  Negative for arthralgias and myalgias.   Skin:  Negative for wound.   Neurological:  Negative for numbness.       OBJECTIVE     Vitals:    06/30/25 1415   BP: 138/82   Pulse: 104   SpO2: 97%       PHYSICAL EXAM  GEN:   Accompanied by none.     Foot/Ankle Exam    GENERAL  Appearance:  appears stated age  Orientation:  AAOx3  Affect:  appropriate  Gait:  unimpaired  Assistance:  independent  Right shoe gear: casual shoe  Left shoe gear: casual  shoe    VASCULAR     Left Foot Vascularity   Dorsalis pedis:  2+  Posterior tibial:  2+  Skin temperature:  warm  Edema grading:  None  CFT:  3  Pedal hair growth:  Present  Varicosities:  none     NEUROLOGIC     Left Foot Neurologic   Normal sensation    Light touch sensation: normal  Vibratory sensation: normal  Hot/Cold sensation:  normal  Protective Sensation using [place] Monofilament:   Sites intact: 10  Sites tested: 10    MUSCULOSKELETAL     Left Foot Musculoskeletal   Tenderness:  callous left foot  Arch:  Pes planus    MUSCLE STRENGTH     Left Foot Muscle Strength   Foot dorsiflexion:  5  Foot plantar flexion:  5  Foot inversion:  5  Foot eversion:  5    RANGE OF MOTION     Left Foot Range of Motion   Foot and ankle ROM within normal limits      DERMATOLOGIC        Left Foot Dermatologic   Skin  Positive for corn.     Image:       RADIOLOGY/NUCLEAR:  No results found.    LABORATORY/CULTURE RESULTS:      PATHOLOGY RESULTS:       ASSESSMENT/PLAN     Diagnoses and all orders for this visit:    1. Callus (Primary)    2. Type 2 diabetes mellitus without complication, without long-term current use of insulin    3. Pain of left great toe      Comprehensive lower extremity examination and evaluation was performed.  Discussed findings and treatment plan including risks, benefits, and treatment options with patient in detail. Patient agreed with treatment plan.  PCP note reviewed.  Hemoglobin A1c reviewed/6.3%.  After verbal consent obtained, calluses x1 pared utilizing dermal curette and/or scalpel without incidence  Reviewed at home diabetic foot care including daily foot checks   Follow-up with PCP for diabetic management.  Patient may maintain callused areas by using a pumice stone or emery board after getting out of the shower.  After filing the areas down, a good moisturizer should be applied to the areas.  It is recommended that the patient continue to monitor the areas for any drainage or signs of  infection.  Callused areas may turn into a wound or open sore and should be monitored closely.  Recommend patient to begin use of urea cream 40% to the callused area.  Toe cap x 1 dispensed.  An After Visit Summary was printed and given to the patient at discharge, including (if requested) any available informative/educational handouts regarding diagnosis, treatment, or medications. All questions were answered to patient/family satisfaction. Should symptoms fail to improve or worsen they agree to call or return to clinic or to go to the Emergency Department. Discussed the importance of following up with any needed screening tests/labs/specialist appointments and any requested follow-up recommended by me today. Importance of maintaining follow-up discussed and patient accepts that missed appointments can delay diagnosis and potentially lead to worsening of conditions.  Return if symptoms worsen or fail to improve., or sooner if acute issues arise.        This document has been electronically signed by TERENCE Colin on June 30, 2025 16:16 CDT

## 2025-06-30 ENCOUNTER — OFFICE VISIT (OUTPATIENT)
Age: 65
End: 2025-06-30
Payer: MEDICARE

## 2025-06-30 VITALS
DIASTOLIC BLOOD PRESSURE: 82 MMHG | HEIGHT: 64 IN | OXYGEN SATURATION: 97 % | HEART RATE: 104 BPM | WEIGHT: 195 LBS | BODY MASS INDEX: 33.29 KG/M2 | SYSTOLIC BLOOD PRESSURE: 138 MMHG

## 2025-06-30 DIAGNOSIS — E11.9 TYPE 2 DIABETES MELLITUS WITHOUT COMPLICATION, WITHOUT LONG-TERM CURRENT USE OF INSULIN: ICD-10-CM

## 2025-06-30 DIAGNOSIS — M79.675 PAIN OF LEFT GREAT TOE: ICD-10-CM

## 2025-06-30 DIAGNOSIS — L84 CALLUS: Primary | ICD-10-CM

## 2025-08-07 DIAGNOSIS — E04.2 NONTOXIC MULTINODULAR GOITER: Primary | ICD-10-CM
